# Patient Record
Sex: FEMALE | Race: BLACK OR AFRICAN AMERICAN | Employment: FULL TIME | URBAN - METROPOLITAN AREA
[De-identification: names, ages, dates, MRNs, and addresses within clinical notes are randomized per-mention and may not be internally consistent; named-entity substitution may affect disease eponyms.]

---

## 2018-12-01 ENCOUNTER — APPOINTMENT (OUTPATIENT)
Dept: CT IMAGING | Age: 38
End: 2018-12-01
Payer: COMMERCIAL

## 2018-12-01 ENCOUNTER — HOSPITAL ENCOUNTER (EMERGENCY)
Age: 38
Discharge: HOME | End: 2018-12-01
Attending: EMERGENCY MEDICINE
Payer: COMMERCIAL

## 2018-12-01 VITALS
OXYGEN SATURATION: 99 % | TEMPERATURE: 98.8 F | HEIGHT: 62 IN | WEIGHT: 293 LBS | RESPIRATION RATE: 17 BRPM | HEART RATE: 73 BPM | DIASTOLIC BLOOD PRESSURE: 97 MMHG | BODY MASS INDEX: 53.92 KG/M2 | SYSTOLIC BLOOD PRESSURE: 153 MMHG

## 2018-12-01 DIAGNOSIS — E34.9 ELEVATED SERUM HCG: ICD-10-CM

## 2018-12-01 DIAGNOSIS — G43.B0 OPHTHALMOPLEGIC MIGRAINE, NOT INTRACTABLE: Primary | ICD-10-CM

## 2018-12-01 DIAGNOSIS — R51.9 ACUTE NONINTRACTABLE HEADACHE, UNSPECIFIED HEADACHE TYPE: ICD-10-CM

## 2018-12-01 LAB
A/G RATIO: 1.2 (ref 1.1–2.2)
ALBUMIN SERPL-MCNC: 3.8 G/DL (ref 3.4–5)
ALP BLD-CCNC: 85 U/L (ref 40–129)
ALT SERPL-CCNC: 11 U/L (ref 10–40)
ANION GAP SERPL CALCULATED.3IONS-SCNC: 11 MMOL/L (ref 3–16)
APTT: 26.6 SEC (ref 26–36)
AST SERPL-CCNC: 14 U/L (ref 15–37)
BASOPHILS ABSOLUTE: 0.1 K/UL (ref 0–0.2)
BASOPHILS RELATIVE PERCENT: 0.7 %
BILIRUB SERPL-MCNC: <0.2 MG/DL (ref 0–1)
BILIRUBIN URINE: NEGATIVE
BLOOD, URINE: NEGATIVE
BUN BLDV-MCNC: 9 MG/DL (ref 7–20)
CALCIUM SERPL-MCNC: 9.2 MG/DL (ref 8.3–10.6)
CHLORIDE BLD-SCNC: 102 MMOL/L (ref 99–110)
CLARITY: CLEAR
CO2: 24 MMOL/L (ref 21–32)
COLOR: ABNORMAL
CREAT SERPL-MCNC: <0.5 MG/DL (ref 0.6–1.1)
EOSINOPHILS ABSOLUTE: 0.1 K/UL (ref 0–0.6)
EOSINOPHILS RELATIVE PERCENT: 0.7 %
EPITHELIAL CELLS, UA: NORMAL /HPF
GFR AFRICAN AMERICAN: >60
GFR NON-AFRICAN AMERICAN: >60
GLOBULIN: 3.2 G/DL
GLUCOSE BLD-MCNC: 114 MG/DL (ref 70–99)
GLUCOSE URINE: NEGATIVE MG/DL
GONADOTROPIN, CHORIONIC (HCG) QUANT: 10.4 MIU/ML
HCT VFR BLD CALC: 35.8 % (ref 36–48)
HEMOGLOBIN: 11.8 G/DL (ref 12–16)
INR BLD: 0.94 (ref 0.86–1.14)
KETONES, URINE: NEGATIVE MG/DL
LEUKOCYTE ESTERASE, URINE: ABNORMAL
LYMPHOCYTES ABSOLUTE: 1.7 K/UL (ref 1–5.1)
LYMPHOCYTES RELATIVE PERCENT: 16.7 %
MCH RBC QN AUTO: 26.9 PG (ref 26–34)
MCHC RBC AUTO-ENTMCNC: 33 G/DL (ref 31–36)
MCV RBC AUTO: 81.6 FL (ref 80–100)
MICROSCOPIC EXAMINATION: YES
MONOCYTES ABSOLUTE: 0.5 K/UL (ref 0–1.3)
MONOCYTES RELATIVE PERCENT: 5.1 %
NEUTROPHILS ABSOLUTE: 7.9 K/UL (ref 1.7–7.7)
NEUTROPHILS RELATIVE PERCENT: 76.8 %
NITRITE, URINE: NEGATIVE
PDW BLD-RTO: 15.1 % (ref 12.4–15.4)
PH UA: 6.5
PLATELET # BLD: 262 K/UL (ref 135–450)
PMV BLD AUTO: 7.9 FL (ref 5–10.5)
POTASSIUM SERPL-SCNC: 3.7 MMOL/L (ref 3.5–5.1)
PROTEIN UA: NEGATIVE MG/DL
PROTHROMBIN TIME: 10.7 SEC (ref 9.8–13)
RBC # BLD: 4.39 M/UL (ref 4–5.2)
RBC UA: NORMAL /HPF (ref 0–2)
SODIUM BLD-SCNC: 137 MMOL/L (ref 136–145)
SPECIFIC GRAVITY UA: 1.01
TOTAL PROTEIN: 7 G/DL (ref 6.4–8.2)
URINE TYPE: ABNORMAL
UROBILINOGEN, URINE: 0.2 E.U./DL
WBC # BLD: 10.3 K/UL (ref 4–11)
WBC UA: NORMAL /HPF (ref 0–5)

## 2018-12-01 PROCEDURE — 81001 URINALYSIS AUTO W/SCOPE: CPT

## 2018-12-01 PROCEDURE — 70498 CT ANGIOGRAPHY NECK: CPT

## 2018-12-01 PROCEDURE — 2580000003 HC RX 258: Performed by: EMERGENCY MEDICINE

## 2018-12-01 PROCEDURE — 85025 COMPLETE CBC W/AUTO DIFF WBC: CPT

## 2018-12-01 PROCEDURE — 99284 EMERGENCY DEPT VISIT MOD MDM: CPT

## 2018-12-01 PROCEDURE — 80053 COMPREHEN METABOLIC PANEL: CPT

## 2018-12-01 PROCEDURE — 70496 CT ANGIOGRAPHY HEAD: CPT

## 2018-12-01 PROCEDURE — 84702 CHORIONIC GONADOTROPIN TEST: CPT

## 2018-12-01 PROCEDURE — 96374 THER/PROPH/DIAG INJ IV PUSH: CPT

## 2018-12-01 PROCEDURE — 85610 PROTHROMBIN TIME: CPT

## 2018-12-01 PROCEDURE — 36415 COLL VENOUS BLD VENIPUNCTURE: CPT

## 2018-12-01 PROCEDURE — 93005 ELECTROCARDIOGRAM TRACING: CPT | Performed by: EMERGENCY MEDICINE

## 2018-12-01 PROCEDURE — 85730 THROMBOPLASTIN TIME PARTIAL: CPT

## 2018-12-01 PROCEDURE — 96361 HYDRATE IV INFUSION ADD-ON: CPT

## 2018-12-01 PROCEDURE — 70450 CT HEAD/BRAIN W/O DYE: CPT

## 2018-12-01 PROCEDURE — 6360000002 HC RX W HCPCS: Performed by: EMERGENCY MEDICINE

## 2018-12-01 PROCEDURE — 6360000004 HC RX CONTRAST MEDICATION: Performed by: EMERGENCY MEDICINE

## 2018-12-01 RX ORDER — 0.9 % SODIUM CHLORIDE 0.9 %
1000 INTRAVENOUS SOLUTION INTRAVENOUS ONCE
Status: COMPLETED | OUTPATIENT
Start: 2018-12-01 | End: 2018-12-01

## 2018-12-01 RX ADMIN — SODIUM CHLORIDE 1000 ML: 9 INJECTION, SOLUTION INTRAVENOUS at 15:27

## 2018-12-01 RX ADMIN — IOVERSOL 100 ML: 678 INJECTION INTRA-ARTERIAL; INTRAVENOUS at 15:05

## 2018-12-01 RX ADMIN — PROCHLORPERAZINE EDISYLATE 10 MG: 5 INJECTION INTRAMUSCULAR; INTRAVENOUS at 15:26

## 2018-12-01 ASSESSMENT — PAIN DESCRIPTION - ORIENTATION
ORIENTATION: LEFT;RIGHT
ORIENTATION: RIGHT;LEFT
ORIENTATION: LEFT;RIGHT

## 2018-12-01 ASSESSMENT — PAIN DESCRIPTION - LOCATION
LOCATION: HEAD

## 2018-12-01 ASSESSMENT — PAIN SCALES - GENERAL
PAINLEVEL_OUTOF10: 3
PAINLEVEL_OUTOF10: 6
PAINLEVEL_OUTOF10: 2

## 2018-12-01 ASSESSMENT — PAIN DESCRIPTION - DESCRIPTORS
DESCRIPTORS: PRESSURE;THROBBING
DESCRIPTORS: THROBBING

## 2018-12-02 PROCEDURE — 93010 ELECTROCARDIOGRAM REPORT: CPT | Performed by: INTERNAL MEDICINE

## 2018-12-04 LAB
GLUCOSE BLD-MCNC: 114 MG/DL (ref 70–99)
PERFORMED ON: ABNORMAL

## 2018-12-26 LAB
EKG ATRIAL RATE: 77 BPM
EKG DIAGNOSIS: NORMAL
EKG P AXIS: 47 DEGREES
EKG P-R INTERVAL: 160 MS
EKG Q-T INTERVAL: 398 MS
EKG QRS DURATION: 78 MS
EKG QTC CALCULATION (BAZETT): 450 MS
EKG R AXIS: 25 DEGREES
EKG T AXIS: 42 DEGREES
EKG VENTRICULAR RATE: 77 BPM

## 2019-09-12 ENCOUNTER — OFFICE VISIT (OUTPATIENT)
Dept: ORTHOPEDIC SURGERY | Age: 39
End: 2019-09-12
Payer: COMMERCIAL

## 2019-09-12 VITALS — HEART RATE: 61 BPM | DIASTOLIC BLOOD PRESSURE: 85 MMHG | TEMPERATURE: 97.5 F | SYSTOLIC BLOOD PRESSURE: 132 MMHG

## 2019-09-12 DIAGNOSIS — M75.82 ROTATOR CUFF TENDONITIS, LEFT: ICD-10-CM

## 2019-09-12 DIAGNOSIS — M25.512 ACUTE PAIN OF LEFT SHOULDER: Primary | ICD-10-CM

## 2019-09-12 PROBLEM — H69.83 ETD (EUSTACHIAN TUBE DYSFUNCTION), BILATERAL: Status: ACTIVE | Noted: 2018-04-01

## 2019-09-12 PROBLEM — K21.9 LARYNGOPHARYNGEAL REFLUX (LPR): Status: ACTIVE | Noted: 2018-04-01

## 2019-09-12 PROBLEM — H69.93 ETD (EUSTACHIAN TUBE DYSFUNCTION), BILATERAL: Status: ACTIVE | Noted: 2018-04-01

## 2019-09-12 PROCEDURE — 20610 DRAIN/INJ JOINT/BURSA W/O US: CPT | Performed by: PHYSICIAN ASSISTANT

## 2019-09-12 PROCEDURE — 99202 OFFICE O/P NEW SF 15 MIN: CPT | Performed by: PHYSICIAN ASSISTANT

## 2019-09-12 NOTE — PROGRESS NOTES
Years: 6.00     Pack years: 1.50    Smokeless tobacco: Never Used    Tobacco comment: quit smoking 2  years ago, encouraged to never smoke again   Substance and Sexual Activity    Alcohol use: Yes     Alcohol/week: 0.0 standard drinks     Comment: socially    Drug use: No    Sexual activity: Yes     Partners: Male       Current Outpatient Medications   Medication Sig Dispense Refill    omeprazole (PRILOSEC) 20 MG capsule Take 1 capsule by mouth daily 30 capsule 3     No current facility-administered medications for this visit. Objective:   She  is alert, oriented x 3, pleasant, well nourished, developed and in no acute distress. /85   Pulse 61   Temp 97.5 °F (36.4 °C) (Temporal)      SHOULDER EXAM:  Examination of the left shoulder shows: There is not a deformity. There is not erythema. There is not soft tissue swelling. Deltoid region is  tender to palpation. AC Joint is not tender to palpation. Clavicle is not tender to palpation. Bicipital Groove is not tender to palpation. Pectoralis  is not tender to palpation. Scapula/ trapezius is not tender to palpation. There is not weakness with supraspinatus testing. There is  pain with supraspinatus testing. Yergason Test negative. Drop Arm Test negative. Apprehension Test negative. Cross Arm Test negative. Shoulder AROM-   degrees  Abduction 120 degrees  IR to ASIS                 ER 60     NEUROLOGICAL EXAM:  Examination of the upper extremities are intact with sensation to light touch. Motor testing  5/5 in all major motor groups including hand intrinsics. Radial, Median and Ulnar nerves are intact. Sorenson's Sign absent. VASCULAR EXAM:  Examination of the upper extremities shows intact perfusion to all extremities. No cyanosis. Digits are warm to touch. Capillary refill is less than 2 seconds. no edema noted.      SKIN:  Examination of the skin over the upper extremities:  Reveals the skin to be intact without lacerations or abrasions. There are no significant erythema, rashes or skin lesions. X Rays: performed in the office today:   AP, Y and Axillary views of the left shoulder. There is no evidence of fracture or dislocation. The subacromial space is not  narrowed. There is not evidence of AC joint arthritis. There is not evidence of Gleno-Humeral arthritis. Assessment:       ICD-10-CM    1. Acute pain of left shoulder M25.512 XR SHOULDER LEFT (MIN 2 VIEWS)   2. Rotator cuff tendonitis, left M75.82 Ambulatory referral to Physical Therapy     MT ARTHROCENTESIS ASPIR&/INJ MAJOR JT/BURSA W/O US     MT TRIAMCINOLONE ACETONIDE INJ        Plan:     Acute left shoulder pain x10 days most likely related to rotator cuff tendinitis. I am concerned with her decreased range of motion and pain complaints she may be developing early adhesive capsulitis. The natural history of the patient's diagnosis as well as the treatment options were discussed in full and questions were answered. Risks and benefits of the treatment options also reviewed in detail. I have discussed risks and benefits of steroid injections to help alleviate pain and to reduce inflammation. She understands and agrees to injection. Cortisone  Injection  PROCEDURE NOTE:  Pre op Diagnosis: Rotator cuff tendinitis  Post op Diagnosis: Same  With the patient's permission, her left shoulder was prepped  in standard sterile fashion with  Alcohol and 2 cc of 0.25% Marcaine and 1 cc of Kenalog 40 mg was injected into the left lateral subacromial space  without difficulty. The patient tolerated this well without difficulty. A band-aid was applied. The patient was advised to ice the shoulder for 15-20 minutes to relieve any injection site related pain. PT Rx provided 2-3 times a week for the next 4 to 6 weeks. Recommend that she start therapy next week. Follow Up: 6 weeks.   Call or return to clinic prn if these symptoms worsen or

## 2019-09-16 NOTE — PLAN OF CARE
Outpatient Physical Therapy  [x] [facility]    Phone: [phone]   Fax: [phone]   [] [facility] [place] [facility]  Phone: [phone]              Fax: [phone]  [] [facility]   Phone: [phone]   Fax: [phone]     To:        Patient: Camila Buchanan   : 1980   MRN: [id number]  Evaluation Date: 2019      Diagnosis Information:  ·  Dx - Rotator cuff tendonitis  · Decreased functional mobility 2/2 shoulder pain   ·       Physical Therapy Certification    Dear LALI Navarro  The following patient has been evaluated for physical therapy services and for therapy to continue, Medicare requires monthly physician review of the treatment plan. Please review the attached evaluation and/or summary of the patient's plan of care, and verify that you agree therapy should continue by signing the attached document and sending it back to our office. Plan of Care/Treatment to date:  [x] Therapeutic Exercise    [x] Modalities:  [x] Therapeutic Activity     [] Ultrasound  [] Electrical Stimulation  [] Gait Training      [] Cervical Traction [] Lumbar Traction  [] Neuromuscular Re-education    [] Cold/hotpack [] Iontophoresis   [x] Instruction in HEP     Other:  [x] Manual Therapy      []             [] Aquatic Therapy      []           ? Frequency/Duration:  # Days per week: [] 1 day # Weeks: [] 1 week [] 5 weeks     [x] 2 days? [] 2 weeks [] 6 weeks     [] 3 days   [] 3 weeks [] 7 weeks     [] 4 days   [] 4 weeks [x] 8 weeks    Rehab Potential: [] Excellent [x] Good [] Fair  [] Poor       Electronically signed by:  Lucas Parker, PT  [id number]      If you have any questions or concerns, please don't hesitate to call.   Thank you for your referral.      Physician Signature:________________________________Date:__________________  By signing above, therapists plan is approved by physician

## 2019-09-17 ENCOUNTER — HOSPITAL ENCOUNTER (OUTPATIENT)
Dept: PHYSICAL THERAPY | Age: 39
Setting detail: THERAPIES SERIES
Discharge: HOME | End: 2019-09-17
Payer: COMMERCIAL

## 2019-09-17 PROCEDURE — 97110 THERAPEUTIC EXERCISES: CPT

## 2019-09-17 PROCEDURE — 97161 PT EVAL LOW COMPLEX 20 MIN: CPT

## 2019-09-17 PROCEDURE — 97530 THERAPEUTIC ACTIVITIES: CPT

## 2019-09-17 NOTE — FLOWSHEET NOTE
Physical Therapy Daily Treatment Note  Date:  2019    Patient Name:  Kerry Fong    :  1980  MRN: [id number]    Restrictions/Precautions: Position Activity Restriction  Other position/activity restrictions: no significant fall risk noted      Pertinent Medical History: Additional Pertinent Hx: alopecia, back pain, cold intelerance, reflux, GERD, hiatal hernia, allergies. Medical/Treatment Diagnosis Information:  ·  Diagnosis - left RC tendonitis  · Treatment Diagnosis: Decreased functional mobility 2/2 left shoulder pain    Insurance/Certification information:  PT Insurance Information: [employer]  Physician Information:  Referring Practitioner: Coby ESCALERA  Plan of care signed (Y/N):  Sent     Visit# / total visits:    Pain level: 3-4/10      Functional Assessment:   Test:  Score:  58.75 UEFs    History of Injury  Subjective: Pt with several month history of shoulder feeling weak and heavy, pain began the first week of September without precipitating injury,     Subjective:  Cortixone Shot  decreased pain from 9-10 to minimal at rest 7/10 with any sudden movements. Objective:    RUE General AROM: Painful with any elevation. Supine PROM flexion 120, painful arc,  ER 65, IR 70 at 60 abd.   abd to 90  Comment: isometric assessment seated, flexion 4-/5, ER 4+/5, IR 4+/5,  abd 4-/5     Exercise/Equipment Resistance/Repetitions Other comments        NT UE only 0#  ADD    UE ranger floor ADD    pulleys add         Shoulder retr add    Low door stretch add         Supine  PROM gentle   Gentle x 10 each     supine isometrics add         Cane exercises  Seated ER/IR  Standing Ext  Standing IR   10  10  10    Counter dusting X 5 each         iontophoresis ADD Script sent ADD NEXT VISIT        CP 10 min left shoulder seated                Other Therapeutic Activities:  Pt was educated on PT POC, Diagnosis, Prognosis, pathomechanics as well as frequency and duration of scheduling future

## 2019-09-20 ENCOUNTER — HOSPITAL ENCOUNTER (OUTPATIENT)
Dept: PHYSICAL THERAPY | Age: 39
Setting detail: THERAPIES SERIES
End: 2019-09-20
Payer: COMMERCIAL

## 2019-09-23 ENCOUNTER — HOSPITAL ENCOUNTER (OUTPATIENT)
Dept: PHYSICAL THERAPY | Age: 39
Setting detail: THERAPIES SERIES
Discharge: HOME | End: 2019-09-23
Payer: COMMERCIAL

## 2019-09-23 PROCEDURE — 97110 THERAPEUTIC EXERCISES: CPT

## 2019-09-23 PROCEDURE — 97033 APP MDLTY 1+IONTPHRSIS EA 15: CPT

## 2019-09-23 NOTE — FLOWSHEET NOTE
Activities:  Pt was educated on PT POC, Diagnosis, Prognosis, pathomechanics as well as frequency and duration of scheduling future physical therapy appointments. Time was also taken on this day to answer all patient questions and participation in PT. Reviewed appointment policy in detail with patient and patient verbalized understanding. Home Exercise Program:  Patient instructed in the following for HEP:   Patient verbalized/demonstrated understanding and was issued written HEP.     Timed Code Treatment Minutes:  37    Total Treatment Minutes:  37    Treatment/Activity Tolerance:  [] Patient tolerated treatment well [] Patient limited by fatigue  [] Patient limited by pain  [] Patient limited by other medical complications  [x] Other: progressing with decreased pain    Prognosis: [x] Good [] Fair  [] Poor    Goals:    Long term goals  Time Frame for Long term goals : discharge  Long term goal 1: full functional ROM for self care, bathing and dressing  Long term goal 2: Full functional strength for ADL and homemaking  Long term goal 3: Return to normal work activities without limitation     Patient Requires Follow-up: [x] Yes  [] No    Plan:   [] Continue per plan of care [] Alter current plan (see comments)  [x] Plan of care initiated [] Hold pending MD visit [] Discharge    Plan for Next Session:  Add above as stated    Electronically signed by:  Dameon Albarran

## 2019-09-25 ENCOUNTER — HOSPITAL ENCOUNTER (OUTPATIENT)
Dept: PHYSICAL THERAPY | Age: 39
Setting detail: THERAPIES SERIES
Discharge: HOME | End: 2019-09-25
Payer: COMMERCIAL

## 2019-09-25 PROCEDURE — 97033 APP MDLTY 1+IONTPHRSIS EA 15: CPT

## 2019-09-25 PROCEDURE — 97110 THERAPEUTIC EXERCISES: CPT

## 2019-10-01 ENCOUNTER — HOSPITAL ENCOUNTER (OUTPATIENT)
Dept: PHYSICAL THERAPY | Age: 39
Setting detail: THERAPIES SERIES
Discharge: HOME | End: 2019-10-01
Payer: COMMERCIAL

## 2019-10-01 PROCEDURE — 97033 APP MDLTY 1+IONTPHRSIS EA 15: CPT

## 2019-10-01 PROCEDURE — 97110 THERAPEUTIC EXERCISES: CPT

## 2019-10-04 ENCOUNTER — APPOINTMENT (OUTPATIENT)
Dept: PHYSICAL THERAPY | Age: 39
End: 2019-10-04
Payer: COMMERCIAL

## 2019-10-07 ENCOUNTER — APPOINTMENT (OUTPATIENT)
Dept: PHYSICAL THERAPY | Age: 39
End: 2019-10-07
Payer: COMMERCIAL

## 2019-10-09 ENCOUNTER — APPOINTMENT (OUTPATIENT)
Dept: PHYSICAL THERAPY | Age: 39
End: 2019-10-09
Payer: COMMERCIAL

## 2019-10-15 ENCOUNTER — HOSPITAL ENCOUNTER (OUTPATIENT)
Dept: PHYSICAL THERAPY | Age: 39
Setting detail: THERAPIES SERIES
Discharge: HOME | End: 2019-10-15
Payer: COMMERCIAL

## 2019-10-15 PROCEDURE — 97530 THERAPEUTIC ACTIVITIES: CPT

## 2019-10-18 ENCOUNTER — APPOINTMENT (OUTPATIENT)
Dept: PHYSICAL THERAPY | Age: 39
End: 2019-10-18
Payer: COMMERCIAL

## 2019-10-21 ENCOUNTER — APPOINTMENT (OUTPATIENT)
Dept: PHYSICAL THERAPY | Age: 39
End: 2019-10-21
Payer: COMMERCIAL

## 2019-10-23 ENCOUNTER — APPOINTMENT (OUTPATIENT)
Dept: PHYSICAL THERAPY | Age: 39
End: 2019-10-23
Payer: COMMERCIAL

## 2021-09-12 ENCOUNTER — HOSPITAL ENCOUNTER (EMERGENCY)
Age: 41
Discharge: HOME | End: 2021-09-12
Attending: EMERGENCY MEDICINE
Payer: COMMERCIAL

## 2021-09-12 VITALS
SYSTOLIC BLOOD PRESSURE: 121 MMHG | TEMPERATURE: 98.5 F | WEIGHT: 293 LBS | RESPIRATION RATE: 14 BRPM | BODY MASS INDEX: 53.92 KG/M2 | DIASTOLIC BLOOD PRESSURE: 74 MMHG | HEIGHT: 62 IN | OXYGEN SATURATION: 100 % | HEART RATE: 78 BPM

## 2021-09-12 DIAGNOSIS — R07.89 ATYPICAL CHEST PAIN: Primary | ICD-10-CM

## 2021-09-12 LAB
ANION GAP SERPL CALCULATED.3IONS-SCNC: 9 MMOL/L (ref 3–16)
BASOPHILS ABSOLUTE: 0.1 K/UL (ref 0–0.2)
BASOPHILS RELATIVE PERCENT: 0.7 %
BUN BLDV-MCNC: 9 MG/DL (ref 7–20)
CALCIUM SERPL-MCNC: 9.1 MG/DL (ref 8.3–10.6)
CHLORIDE BLD-SCNC: 104 MMOL/L (ref 99–110)
CO2: 27 MMOL/L (ref 21–32)
CREAT SERPL-MCNC: 0.6 MG/DL (ref 0.6–1.1)
EOSINOPHILS ABSOLUTE: 0.1 K/UL (ref 0–0.6)
EOSINOPHILS RELATIVE PERCENT: 1.4 %
GFR AFRICAN AMERICAN: >60
GFR NON-AFRICAN AMERICAN: >60
GLUCOSE BLD-MCNC: 83 MG/DL (ref 70–99)
HCT VFR BLD CALC: 35.8 % (ref 36–48)
HEMOGLOBIN: 11.9 G/DL (ref 12–16)
LYMPHOCYTES ABSOLUTE: 1.8 K/UL (ref 1–5.1)
LYMPHOCYTES RELATIVE PERCENT: 20.7 %
MCH RBC QN AUTO: 27.8 PG (ref 26–34)
MCHC RBC AUTO-ENTMCNC: 33.3 G/DL (ref 31–36)
MCV RBC AUTO: 83.4 FL (ref 80–100)
MONOCYTES ABSOLUTE: 0.6 K/UL (ref 0–1.3)
MONOCYTES RELATIVE PERCENT: 7.2 %
NEUTROPHILS ABSOLUTE: 6.3 K/UL (ref 1.7–7.7)
NEUTROPHILS RELATIVE PERCENT: 70 %
PDW BLD-RTO: 14.1 % (ref 12.4–15.4)
PLATELET # BLD: 270 K/UL (ref 135–450)
PMV BLD AUTO: 7.5 FL (ref 5–10.5)
POTASSIUM REFLEX MAGNESIUM: 3.8 MMOL/L (ref 3.5–5.1)
RBC # BLD: 4.29 M/UL (ref 4–5.2)
SODIUM BLD-SCNC: 140 MMOL/L (ref 136–145)
TROPONIN: <0.01 NG/ML
WBC # BLD: 8.9 K/UL (ref 4–11)

## 2021-09-12 PROCEDURE — 99283 EMERGENCY DEPT VISIT LOW MDM: CPT

## 2021-09-12 PROCEDURE — 84484 ASSAY OF TROPONIN QUANT: CPT

## 2021-09-12 PROCEDURE — 80048 BASIC METABOLIC PNL TOTAL CA: CPT

## 2021-09-12 PROCEDURE — 36415 COLL VENOUS BLD VENIPUNCTURE: CPT

## 2021-09-12 PROCEDURE — 93005 ELECTROCARDIOGRAM TRACING: CPT | Performed by: EMERGENCY MEDICINE

## 2021-09-12 PROCEDURE — 85025 COMPLETE CBC W/AUTO DIFF WBC: CPT

## 2021-09-12 RX ORDER — METRONIDAZOLE 7.5 MG/G
GEL TOPICAL
COMMUNITY
End: 2022-10-16 | Stop reason: ALTCHOICE

## 2021-09-12 ASSESSMENT — PAIN DESCRIPTION - DESCRIPTORS: DESCRIPTORS: DULL;ACHING

## 2021-09-12 ASSESSMENT — PAIN DESCRIPTION - LOCATION: LOCATION: CHEST

## 2021-09-12 ASSESSMENT — PAIN DESCRIPTION - ORIENTATION: ORIENTATION: LEFT

## 2021-09-12 ASSESSMENT — PAIN DESCRIPTION - PAIN TYPE: TYPE: ACUTE PAIN

## 2021-09-12 ASSESSMENT — PAIN SCALES - GENERAL: PAINLEVEL_OUTOF10: 4

## 2021-09-12 NOTE — ED PROVIDER NOTES
eMERGENCY dEPARTMENT eNCOUnter      Pt Name: Kellen Reynolds  MRN: [id number]  [place] 1980  Date of evaluation: 9/12/2021  Provider: Odalis Barnes MD     [address] [place] [address]       Chief Complaint   Patient presents with    Chest Pain     has had for several days, started with neck pain then intermittent under left breast. Today shooting through back and down left arm. Yesterday was lightheaded. Nausea today         HISTORY OF PRESENT ILLNESS   (Location/Symptom, Timing/Onset,Context/Setting, Quality, Duration, Modifying Factors, Severity) Note limiting factors. HPI    Kellen Reynolds is a 46 y.o. female who presents to the emergency department with some chest pain that radiates to her neck and arm for several days. Patient denies any injury. Starts under her left breast that shoots up to the left shoulder trapezius area and down the forearm. The chest pain is intermittent lasting only few seconds. There is some pressure sensation of the arm. Movement is not associated with the pain or does not exacerbates. Patient denies any injury. States not mostly on the left side. There has been no shortness of breath nausea or vomiting. Patient has no other risk factor except for obesity. Patient is not a diabetic. Nursing Notes were reviewed. REVIEW OFSYSTEMS    (2+ for level 4; 10+ for level 5)   Review of Systems    General: No fevers, chills or night sweats, No weight loss    Head:  No Sore throat,  No Ear Pain    Chest: tender. No Cough, No SOB,  Chest Pain is positive and its intermittent. GI: No abdominal pain or vomiting    : No dysuria or hematuria    Musculoskeletal: No unrelenting pain or night pain    Neurologic: No bowel or bladder incontinence, No saddle anesthesia, No leg weakness    All other systems reviewed and are negative.         PAST MEDICAL HISTORY     Past Medical History:   Diagnosis Date    Alopecia     hair loss    Anesthesia     pt states she wakes up during surgery  Back pain     injury 15 years ago--pt states she is not medically treated    Cold intolerance     Esophageal reflux 2009    GERD (gastroesophageal reflux disease)     Gestational diabetes     Hiatal hernia     Seasonal allergies     spring and summer    Weight gain     OB thought due hormonal imbalance       SURGICAL HISTORY       Past Surgical History:   Procedure Laterality Date    ABDOMEN SURGERY      COLON SURGERY  16    LIPOMA RESECTION Right 2006    removed from right arm    TUBAL LIGATION  2004       CURRENT MEDICATIONS       Previous Medications    METRONIDAZOLE (METROGEL) 0.75 % GEL    metronidazole 0.75 % topical gel       ALLERGIES     Patient has no known allergies. FAMILY HISTORY       Family History   Problem Relation Age of Onset    Alcohol Abuse Father     Thyroid Disease Mother     Diabetes Mother     Other Sister         Cognitive Delay    Other Sister         GERD    Other Sister         pre cancerous cervical cells    Other Brother         Cognitive delay        SOCIAL HISTORY       Social History     Socioeconomic History    Marital status:      Spouse name: None    Number of children: 2    Years of education: None    Highest education level: None   Occupational History    Occupation:      Employer: [employer]   Tobacco Use    Smoking status: Former Smoker     Packs/day: 0.25     Years: 6.00     Pack years: 1.50     Quit date: 2014     Years since quittin.0    Smokeless tobacco: Never Used    Tobacco comment: quit smoking 2  years ago, encouraged to never smoke again   Substance and Sexual Activity    Alcohol use:  Yes     Alcohol/week: 0.0 standard drinks     Comment: socially    Drug use: No    Sexual activity: Yes     Partners: Male   Other Topics Concern    None   Social History Narrative    None     Social Determinants of Health     Financial Resource Strain:     Difficulty of Paying Living Expenses:    Food Insecurity:     Worried About Running Out of Food in the Last Year:     [address] in the Last Year:    Transportation Needs:     Lack of Transportation (Medical):  Lack of Transportation (Non-Medical):    Physical Activity:     Days of Exercise per Week:     Minutes of Exercise per Session:    Stress:     Feeling of Stress :    Social Connections:     Frequency of Communication with Friends and Family:     Frequency of Social Gatherings with Friends and Family:     Attends Voodoo Services:     Active Member of Clubs or Organizations:     Attends Club or Organization Meetings:     Marital Status:    Intimate Partner Violence:     Fear of Current or Ex-Partner:     Emotionally Abused:     Physically Abused:     Sexually Abused:        SCREENINGS           PHYSICAL EXAM    (up to 7 for level 4, 8 or more for level 5)     ED Triage Vitals   BP Temp Temp src Pulse Resp SpO2 Height Weight   -- -- -- -- -- -- -- --       Physical Exam    General: Alert and awake ×3. Nontoxic appearance. Well-developed well-nourished 71-year-old in no acute distress obese female. HEENT: Normocephalic atraumatic. Neck is supple. Airway intact. No adenopathy  Cardiac: Regular rate and rhythm with no murmurs rubs or gallops. Chest wall is tender on the left side. Neurovascular exam is normal.  Pulmonary: Lungs are clear in all lung fields. No wheezing. No Rales. Abdomen: Soft and nontender. Negative hepatosplenomegaly. Bowel sounds are active  Extremities: Moving all extremities. No calf tenderness. Peripheral pulses all intact  Skin: No skin lesions. No rashes  Neurologic: Cranial nerves II through XII was grossly intact. Nonfocal neurological exam  Psychiatric: Patient is pleasant. Mood is appropriate.         DIAGNOSTIC RESULTS     EKG (Per Emergency Physician):   Normal sinus rhythm ventricular of 71 minimal voltage for LVH borderline EKG    RADIOLOGY (Per Emergency Physician): Interpretation per the Radiologist below, if available at the time of this note:  No results found. ED BEDSIDE ULTRASOUND:   Performed by ED Physician - none    LABS:  Labs Reviewed   CBC WITH AUTO DIFFERENTIAL - Abnormal; Notable for the following components:       Result Value    Hemoglobin 11.9 (*)     Hematocrit 35.8 (*)     All other components within normal limits    Narrative:     Performed at:  [facility]  [address] [place], [place]   Phone [phone]   [address] MG FOR LOW K    Narrative:     Performed at:  [facility]  [address] [place], [place]   Phone [phone]   TROPONIN    Narrative:     Performed at:  [facility]  [address] [place], [place]   Phone [phone]        All other labs were within normal range or not returned as of this dictation. Procedures      EMERGENCY DEPARTMENT COURSE and DIFFERENTIAL DIAGNOSIS/MDM:   Vitals:    Vitals:    09/12/21 1542 09/12/21 1553   BP:  128/74   Pulse:  83   Temp:  98.5 °F (36.9 °C)   TempSrc:  Oral   Weight: (!) 315 lb (142.9 kg) (!) 317 lb 1.6 oz (143.8 kg)   Height: 5' 2\" (1.575 m) 5' 2\" (1.575 m)       Medications - No data to display    MDM. Is obese 72-year-old with some chest pain that is intermittent with some component of muscle strain. It is reproducible. Neurovascular is intact exam is reassuring EKG is normal labs are unremarkable patient will need outpatient follow-up could be a pinched nerve other differential includes atypical chest pain. Doubt it is related to pulmonary etiology at this time. Patient will discharge to follow-up return if worse. REVAL:         CRITICAL CARE TIME   Total CriticalCare time was 0 minutes, excluding separately reportable procedures.   There was a high probability of clinically significant/life threatening deterioration in the patient's condition which required my urgent intervention. CONSULTS:  None    PROCEDURES:  Unless otherwise noted below, none     [unfilled]    FINAL IMPRESSION      1. Atypical chest pain          DISPOSITION/PLAN   DISPOSITION        PATIENT REFERRED TO:  [place] [id number] [phone]    Schedule an appointment as soon as possible for a visit in 1 week  If symptoms worsen, As needed      DISCHARGE MEDICATIONS:  New Prescriptions    No medications on file          (Please note:  Portions of this note were completed with a voice recognition program.Efforts were made to edit the dictations but occasionally words and phrases are mis-transcribed.)  Form v2016. J.5-cn    JHAAIRA MCGARRY MD (electronically signed)  Emergency Medicine Provider        Emily Maki MD  09/12/21 0393

## 2021-09-13 LAB
EKG ATRIAL RATE: 71 BPM
EKG DIAGNOSIS: NORMAL
EKG P AXIS: 23 DEGREES
EKG P-R INTERVAL: 154 MS
EKG Q-T INTERVAL: 422 MS
EKG QRS DURATION: 74 MS
EKG QTC CALCULATION (BAZETT): 458 MS
EKG R AXIS: 15 DEGREES
EKG T AXIS: 40 DEGREES
EKG VENTRICULAR RATE: 71 BPM

## 2021-09-13 PROCEDURE — 93010 ELECTROCARDIOGRAM REPORT: CPT | Performed by: INTERNAL MEDICINE

## 2021-12-02 ENCOUNTER — TELEPHONE (OUTPATIENT)
Dept: ORTHOPEDIC SURGERY | Age: 41
End: 2021-12-02

## 2021-12-02 ENCOUNTER — OFFICE VISIT (OUTPATIENT)
Dept: ORTHOPEDIC SURGERY | Age: 41
End: 2021-12-02
Payer: COMMERCIAL

## 2021-12-02 VITALS — BODY MASS INDEX: 53.92 KG/M2 | WEIGHT: 293 LBS | HEIGHT: 62 IN

## 2021-12-02 DIAGNOSIS — M75.81 ROTATOR CUFF TENDONITIS, RIGHT: Primary | ICD-10-CM

## 2021-12-02 DIAGNOSIS — M25.511 ACUTE PAIN OF RIGHT SHOULDER: ICD-10-CM

## 2021-12-02 DIAGNOSIS — M19.011 ARTHRITIS OF RIGHT ACROMIOCLAVICULAR JOINT: ICD-10-CM

## 2021-12-02 PROCEDURE — 99203 OFFICE O/P NEW LOW 30 MIN: CPT | Performed by: ORTHOPAEDIC SURGERY

## 2021-12-02 PROCEDURE — 20610 DRAIN/INJ JOINT/BURSA W/O US: CPT | Performed by: ORTHOPAEDIC SURGERY

## 2021-12-02 RX ORDER — BUPIVACAINE HYDROCHLORIDE 2.5 MG/ML
3 INJECTION, SOLUTION INFILTRATION; PERINEURAL ONCE
Status: COMPLETED | OUTPATIENT
Start: 2021-12-02 | End: 2021-12-02

## 2021-12-02 RX ORDER — TRIAMCINOLONE ACETONIDE 40 MG/ML
80 INJECTION, SUSPENSION INTRA-ARTICULAR; INTRAMUSCULAR ONCE
Status: COMPLETED | OUTPATIENT
Start: 2021-12-02 | End: 2021-12-02

## 2021-12-02 RX ORDER — IBUPROFEN 800 MG/1
800 TABLET ORAL 2 TIMES DAILY WITH MEALS
Qty: 60 TABLET | Refills: 0 | Status: SHIPPED | OUTPATIENT
Start: 2021-12-02

## 2021-12-02 RX ADMIN — TRIAMCINOLONE ACETONIDE 80 MG: 40 INJECTION, SUSPENSION INTRA-ARTICULAR; INTRAMUSCULAR at 11:05

## 2021-12-02 RX ADMIN — BUPIVACAINE HYDROCHLORIDE 7.5 MG: 2.5 INJECTION, SOLUTION INFILTRATION; PERINEURAL at 11:05

## 2021-12-02 NOTE — PROGRESS NOTES
Kayla Ames  <[id number]>  December 2, 2021    Chief Complaint   Patient presents with    Shoulder Pain     Rt. Pain for 2 weeks  No injury       History: The patient is a 72-year-old female who is here for evaluation of her right shoulder. The patient developed right shoulder pain approximately 2 weeks ago. She reportedly was organizing a  and she was rather active. She then developed significant right shoulder pain. She has difficulty with overhead activities. She has been using a sling for comfort. She did take some ibuprofen which seemed to help. She had a similar issue with her left shoulder in the remote past and she responded well with an injection. The patient is right-hand dominant. This is a consult from Corbin Kearns MD for right shoulder pain. The patient's  past medical history, medications, allergies,  family history, social history, and have been reviewed, and dated and are recorded in the chart. Pertinent items are noted in HPI. Review of systems reviewed from Pertinent History Form dated on 12/2/2021 and available in the patient's chart under the Media tab. Vitals:  Ht 5' 2\" (1.575 m)   Wt (!) 310 lb (140.6 kg)   BMI 56.70 kg/m²     Physical: Physical: The patient presents today in no acute distress. She is alert and oriented to person, place and time. She displays appropriate mood and affect. She is well dressed, nourished and  groomed. She has a normal affect. Examination of the neck reveals no evidence of tenderness. Spurling's sign is negative. Active range of motion of the right shoulder is: 170 degrees abduction, 170 degrees forward flexion, 45 degrees of external rotation and internal rotation to L4. Passive range of motion of the right shoulder is: 175 degrees abduction, 175 degrees forward flexion, 50 degrees of external rotation and internal rotation to L3. Active and passive range of motion of the opposite shoulder is full.  Examination of the right shoulder reveals positive Neer and Werner' impingement signs. There is mild subacromial crepitus with range of motion. Drop arm test is negative bilaterally. Speed's test is negative. There is no evidence of tenderness over the Bicipital groove. She  does have tenderness over the [facility] joint. Cross body adduction test is negative. She has moderate tenderness over the subacromial space. There is no evidence of scapular winging. Lift off sign is negative. There is no evidence of atrophy. External rotation strength is full. There are no skin lesions, cellulitis, or extreme edema in the upper extremities. Sensation is intact to axillary, median, radial, and ulnar nerves bilaterally. The patient has warm and well-perfused bilateral upper extremities with brisk capillary refill. Radial and ulnar pulses are palpable and 2+ bilaterally. X-rays: 4 views of the right shoulder obtained in the office today were extensively reviewed. There is a subtle calcification within the sub acromial space. There is a type II acromion. There is mild AC joint arthritis. Impression: #1 right shoulder calcific rotator cuff tendinitis #2 right shoulder AC joint arthritis    Plan: At this time we will inject the right shoulder under sterile conditions. After a Betadine prep of the shoulder, 3cc of 0.25% Marcaine and 2cc of 40 mg Kenalog were injected into the shoulder. The patient tolerated the injection rather well. The patient was instructed to follow up immediately for any signs of infection. The patient will follow up with me in 6 week(s). The treatment plan was discussed in detail with the patient and includes activity modification and avoidance of repetitive overhead activities. Home exercises were explained to the patient. A formal therapy program was discussed with the patient and was not indicated. If the patient continues to have severe pain and weakness, we will consider other options.   The patient was given a prescription for ibuprofen.

## 2022-10-16 ENCOUNTER — HOSPITAL ENCOUNTER (EMERGENCY)
Age: 42
Discharge: HOME | End: 2022-10-16
Attending: EMERGENCY MEDICINE
Payer: COMMERCIAL

## 2022-10-16 ENCOUNTER — APPOINTMENT (OUTPATIENT)
Dept: GENERAL RADIOLOGY | Age: 42
End: 2022-10-16
Payer: COMMERCIAL

## 2022-10-16 VITALS
HEART RATE: 95 BPM | TEMPERATURE: 98 F | OXYGEN SATURATION: 100 % | RESPIRATION RATE: 18 BRPM | HEIGHT: 62 IN | DIASTOLIC BLOOD PRESSURE: 98 MMHG | WEIGHT: 293 LBS | BODY MASS INDEX: 53.92 KG/M2 | SYSTOLIC BLOOD PRESSURE: 154 MMHG

## 2022-10-16 DIAGNOSIS — M75.31 CALCIFIC TENDINITIS OF RIGHT SHOULDER: Primary | ICD-10-CM

## 2022-10-16 DIAGNOSIS — M25.511 CHRONIC RIGHT SHOULDER PAIN: ICD-10-CM

## 2022-10-16 DIAGNOSIS — G89.29 CHRONIC RIGHT SHOULDER PAIN: ICD-10-CM

## 2022-10-16 PROCEDURE — 6370000000 HC RX 637 (ALT 250 FOR IP): Performed by: EMERGENCY MEDICINE

## 2022-10-16 PROCEDURE — 73030 X-RAY EXAM OF SHOULDER: CPT

## 2022-10-16 PROCEDURE — 99283 EMERGENCY DEPT VISIT LOW MDM: CPT

## 2022-10-16 PROCEDURE — 73502 X-RAY EXAM HIP UNI 2-3 VIEWS: CPT

## 2022-10-16 RX ORDER — NAPROXEN 500 MG/1
500 TABLET ORAL 2 TIMES DAILY WITH MEALS
Qty: 60 TABLET | Refills: 0 | Status: SHIPPED | OUTPATIENT
Start: 2022-10-16

## 2022-10-16 RX ORDER — PREDNISONE 20 MG/1
60 TABLET ORAL ONCE
Status: COMPLETED | OUTPATIENT
Start: 2022-10-16 | End: 2022-10-16

## 2022-10-16 RX ORDER — PREDNISONE 20 MG/1
20 TABLET ORAL DAILY
Qty: 5 TABLET | Refills: 0 | Status: SHIPPED | OUTPATIENT
Start: 2022-10-16 | End: 2022-10-21

## 2022-10-16 RX ORDER — LIDOCAINE 4 G/G
1 PATCH TOPICAL ONCE
Status: DISCONTINUED | OUTPATIENT
Start: 2022-10-16 | End: 2022-10-16 | Stop reason: HOSPADM

## 2022-10-16 RX ORDER — NAPROXEN 250 MG/1
250 TABLET ORAL ONCE
Status: COMPLETED | OUTPATIENT
Start: 2022-10-16 | End: 2022-10-16

## 2022-10-16 RX ADMIN — PREDNISONE 60 MG: 20 TABLET ORAL at 12:30

## 2022-10-16 RX ADMIN — NAPROXEN 250 MG: 250 TABLET ORAL at 12:31

## 2022-10-16 ASSESSMENT — PAIN DESCRIPTION - DESCRIPTORS: DESCRIPTORS: SORE

## 2022-10-16 ASSESSMENT — ENCOUNTER SYMPTOMS: BACK PAIN: 0

## 2022-10-16 ASSESSMENT — PAIN SCALES - GENERAL
PAINLEVEL_OUTOF10: 4
PAINLEVEL_OUTOF10: 6

## 2022-10-16 ASSESSMENT — PAIN DESCRIPTION - PAIN TYPE: TYPE: CHRONIC PAIN;ACUTE PAIN

## 2022-10-16 ASSESSMENT — PAIN DESCRIPTION - LOCATION: LOCATION: SHOULDER;HIP

## 2022-10-16 ASSESSMENT — PAIN DESCRIPTION - ORIENTATION: ORIENTATION: RIGHT

## 2022-10-16 NOTE — ED NOTES
Now back from xray. Lidocaine patch applied as ordered. Right shoulder pain still at 6. Right hip pain at 2.      Ximena Mendieta, RN  10/16/22 223 [facility], RN  10/16/22 1856

## 2022-10-16 NOTE — ED PROVIDER NOTES
[facility] ENCOUNTER      Pt Name: Nate Salazar  MRN: [id number]  [place] 1980  Date ofevaluation: 10/16/2022  Provider: Josefa Ballard MD    CHIEF COMPLAINT       Chief Complaint   Patient presents with    Shoulder Pain     Exacerbation of chronic R shoulder pain x2wks, Ibuprofen regimen ineffective. HISTORY OF PRESENT ILLNESS   (Location/Symptom, Timing/Onset,Context/Setting, Quality, Duration, Modifying Factors, Severity)  Note limiting factors. Nate Salazar is a 38 y.o. female  who  has a past medical history of Alopecia, Anesthesia, Back pain, Cold intolerance, Esophageal reflux, GERD (gastroesophageal reflux disease), Gestational diabetes, Hiatal hernia, Seasonal allergies, and Weight gain. who presents to the emergency department    82-year-old female presents for acute on chronic pain in her right shoulder. Patient has a history and risk factor of prior rotator cuff tendinitis which she has seen orthopedics before in the past.  Worse with lifting her arm or with palpation better with immobilization. Patient been trying ibuprofen with little relief. Patient has not followed up with her primary care doctor or orthopedics recently. Also having aching right hip pain after helping a friend move. No other associated symptoms. Symptoms are gradual in onset constant unchanging. Mild to moderate in nature. Aching and throbbing. The history is provided by the patient. No  was used. NursingNotes were reviewed. REVIEW OF SYSTEMS    (2-9 systems for level 4, 10 or more for level 5)     Review of Systems   Constitutional:  Negative for chills and fever. Musculoskeletal:  Positive for arthralgias. Negative for back pain, joint swelling and neck pain. Skin:  Negative for wound. Neurological:  Negative for weakness and numbness. All other systems reviewed and are negative.     Except as noted above the remainder of the review of systems was reviewed and negative. PAST MEDICAL HISTORY     Past Medical History:   Diagnosis Date    Alopecia     hair loss    Anesthesia     pt states she wakes up during surgery    Back pain     injury 15 years ago--pt states she is not medically treated    Cold intolerance     Esophageal reflux 2009    GERD (gastroesophageal reflux disease)     Gestational diabetes     Hiatal hernia     Seasonal allergies     spring and summer    Weight gain     OB thought due hormonal imbalance         SURGICALHISTORY       Past Surgical History:   Procedure Laterality Date    ABDOMEN SURGERY      COLON SURGERY  16    LIPOMA RESECTION Right 2006    removed from right arm    TUBAL LIGATION  2004         CURRENT MEDICATIONS       Previous Medications    IBUPROFEN (ADVIL;MOTRIN) 800 MG TABLET    Take 1 tablet by mouth 2 times daily (with meals)            Patient has no known allergies. FAMILY HISTORY       Family History   Problem Relation Age of Onset    Alcohol Abuse Father     Thyroid Disease Mother     Diabetes Mother     Other Sister         Cognitive Delay    Other Sister         GERD    Other Sister         pre cancerous cervical cells    Other Brother         Cognitive delay          SOCIAL HISTORY       Social History     Socioeconomic History    Marital status:      Spouse name: None    Number of children: 2    Years of education: None    Highest education level: None   Occupational History    Occupation:      Employer: [employer]   Tobacco Use    Smoking status: Former     Packs/day: 0.25     Years: 6.00     Pack years: 1.50     Types: Cigarettes     Quit date: 2014     Years since quittin.0    Smokeless tobacco: Never    Tobacco comments:     quit smoking 2  years ago, encouraged to never smoke again   Vaping Use    Vaping Use: Never used   Substance and Sexual Activity    Alcohol use:  Yes     Alcohol/week: 0.0 standard drinks Comment: socially    Drug use: No    Sexual activity: Yes     Partners: Male       SCREENINGS    Fran Coma Scale  Eye Opening: Spontaneous  Best Verbal Response: Oriented  Best Motor Response: Obeys commands  Fran Coma Scale Score: 15        PHYSICAL EXAM    (up to 7 for level 4, 8 or more for level 5)     ED Triage Vitals   BP Temp Temp src Pulse Resp SpO2 Height Weight   -- -- -- -- -- -- -- --       Physical Exam  Vitals and nursing note reviewed. Constitutional:       General: She is not in acute distress. Appearance: Normal appearance. She is not ill-appearing, toxic-appearing or diaphoretic. HENT:      Head: Normocephalic and atraumatic. Mouth/Throat:      Mouth: Mucous membranes are moist.   Eyes:      Extraocular Movements: Extraocular movements intact. Cardiovascular:      Rate and Rhythm: Normal rate. Pulses: Normal pulses. Pulmonary:      Effort: Pulmonary effort is normal.   Abdominal:      Palpations: Abdomen is soft. Tenderness: There is no abdominal tenderness. Musculoskeletal:      Right shoulder: Tenderness and bony tenderness present. No swelling, deformity or effusion. Decreased range of motion. Normal strength. Left shoulder: No swelling, deformity, effusion, tenderness or bony tenderness. Normal range of motion. Normal strength. Right upper arm: No swelling, deformity, tenderness or bony tenderness. Left upper arm: No swelling, deformity, tenderness or bony tenderness. Right elbow: No swelling or deformity. Normal range of motion. No tenderness. Left elbow: No swelling or deformity. Normal range of motion. No tenderness. Right forearm: No tenderness or bony tenderness. Left forearm: No tenderness or bony tenderness. Right wrist: No swelling, deformity, tenderness, bony tenderness or snuff box tenderness. Normal range of motion. Normal pulse.       Left wrist: No swelling, deformity, tenderness, bony tenderness or snuff box tenderness. Normal range of motion. Normal pulse. Right hand: No swelling, deformity, tenderness or bony tenderness. Normal range of motion. Normal strength. Normal sensation. There is no disruption of two-point discrimination. Normal capillary refill. Normal pulse. Left hand: No swelling, deformity, tenderness or bony tenderness. Normal range of motion. Normal strength. Normal sensation. There is no disruption of two-point discrimination. Normal capillary refill. Normal pulse. Right hip: Bony tenderness present. No deformity, lacerations, tenderness or crepitus. Normal range of motion. Normal strength. Left hip: Normal. No deformity, lacerations, tenderness, bony tenderness or crepitus. Normal range of motion. Normal strength. Right upper leg: Normal.      Left upper leg: Normal.      Right knee: No swelling, deformity, effusion, erythema, ecchymosis or bony tenderness. Normal range of motion. No tenderness. Normal alignment. Left knee: No swelling, deformity, effusion, erythema, ecchymosis or bony tenderness. Normal range of motion. Normal alignment. Right lower leg: No swelling, deformity, tenderness or bony tenderness. No edema. Left lower leg: No swelling, deformity, tenderness or bony tenderness. No edema. Right ankle: No swelling, deformity, ecchymosis or lacerations. No tenderness. Normal range of motion. Normal pulse. Left ankle: No swelling, deformity, ecchymosis or lacerations. No tenderness. Normal range of motion. Normal pulse. Right foot: Normal range of motion and normal capillary refill. No swelling, deformity, tenderness or bony tenderness. Normal pulse. Left foot: Normal range of motion and normal capillary refill. No tenderness or bony tenderness. Normal pulse. Comments: Right shoulder with range of motion limited due to pain. Tenderness to palpation at the [facility] joint and at the proximal humerus.    Skin:     General: Skin is warm and dry.      Capillary Refill: Capillary refill takes less than 2 seconds. Neurological:      General: No focal deficit present. Mental Status: She is alert. Psychiatric:         Mood and Affect: Mood normal.       RESULTS     EKG: All EKG's are interpreted by the Emergency Department Physician who either signs or Co-signs this chart in the absence of a cardiologist.      RADIOLOGY:   Russell Leghorn such as CT, Ultrasound and MRI are read by the radiologist.  All images reviewed by the emergency department physician who either signs or cosigns this chart. Interpretation per the Radiologist below, if available at the time ofthis note:    XR HIP RIGHT (2-3 VIEWS)   Final Result   No acute osseous abnormality. XR SHOULDER RIGHT (MIN 2 VIEWS)   Final Result   Calcific tendinosis. ED BEDSIDE ULTRASOUND:   Performed by ED Physician - none    LABS:  Labs Reviewed - No data to display    All other labs were within normal range or not returned as of this dictation. EMERGENCY DEPARTMENT COURSE and DIFFERENTIAL DIAGNOSIS/MDM:   Vitals:    Vitals:    10/16/22 1205   BP: (!) 154/98   Pulse: 95   Resp: 18   Temp: 98 °F (36.7 °C)   TempSrc: Oral   SpO2: 100%   Weight: (!) 313 lb 0.9 oz (142 kg)   Height: 5' 2\" (1.575 m)       Patient was given thefollowing medications:  Medications   lidocaine 4 % external patch 1 patch (1 patch TransDERmal Patch Applied 10/16/22 1301)   predniSONE (DELTASONE) tablet 60 mg (60 mg Oral Given 10/16/22 1230)   naproxen (NAPROSYN) tablet 250 mg (250 mg Oral Given 10/16/22 1231)       ED COURSE & MEDICAL DECISION MAKING    Pertinent Labs & Imaging studies reviewed. (See chart for details)   -  Patient seen and evaluated in the emergency department. -  Triage and nursing notes reviewed and incorporated. -  Old chart records reviewed and incorporated.   -  Differential diagnosis includes: Differential diagnosis: includes but not limited to rotator cuff sprain/tear/rupture, clavicle fracture, humerus fracture, scapular fracture, posterior or anterior glenohumeral joint dislocation, glenohumeral subluxation, AC joint separation, brachioplexus injury or other peripheral nerve injury, cervical spine neurologic or mechanical bony injury, left apical pneumothorax, arterial injury/Ischemia, Infection, other      53-year-old female presents for acute on chronic right shoulder pain with history of previous rotator cuff tendinitis. She does have strength in her shoulder she is able to lift it but it is significantly tender. There are no signs of complete rotator cuff tear at this time I likely think that this is an exacerbation of her tendinitis. We will obtain repeat films of her shoulder to ensure no bony abnormalities. Otherwise patient to continue NSAIDs. We will give patient lidocaine patch, prednisone as she has been on steroids before which have helped, naproxen as well as encourage ice and follow-up with orthopedics. Will reevaluate after imaging. Hip itself does not appear deformed she is amatory without difficulty we will also obtain x-ray. 2+ DP and PT pulses bilaterally 2+ radial pulses. Neurovascularly intact. -  Work-up included:  See above  -  ED treatment included: See above  -  Results discussed with patient. The patient is agreeable with plan of care and disposition. Is this patient to be included in the SEP-1 Core Measure due to severe sepsis or septic shock? No   Exclusion criteria - the patient is NOT to be included for SEP-1 Core Measure due to: Infection is not suspected     REASSESSMENT     ED Course as of 10/16/22 1316   Sun Oct 16, 2022   1316 Imaging shows calcific tendinosis consistent with prior rotator cuff tendinitis. We will give patient prescription for steroids as well as naproxen. Patient encouraged to follow-up with orthopedics and primary care.  [SC]      ED Course User Index  [SC] Rick Kiser MD     I estimate there is LOW risk for COMPARTMENT SYNDROME, DEEP VENOUS THROMBOSIS, SEPTIC ARTHRITIS, TENDON OR NEUROVASCULAR INJURY, thus I consider the discharge disposition reasonable. The patient is at low risk for mortality based on demographic, history and clinical factors. Given the best available information and clinical assessment, I estimate the risk of hospitalization to be greater than risk of treatment at home. I have explained to the patient that the risk could rapidly change, given precautions for return and instructions. Explained to patient that the risk for mortality is low based on demographic, history and clinical factors. I discussed with patient the results of evaluation in the ED, diagnosis, care, and prognosis. The plan is to discharge to home. Patient is in agreement with plan and questions have been answered. I also discussed with patient the reasons which may require a return visit and the importance of follow-up care. The patient is well-appearing, nontoxic, and improved at the time of discharge. Patient agrees to call to arrange follow-up care as directed. Patient understands to return immediately for worsening/change in symptoms. CRITICAL CARE TIME   Total Critical Care time was 0 minutes, excluding separately reportable procedures. There was a high probability of clinically significant/life threatening deterioration in the patient's condition which required my urgent intervention. This includes multiple reevaluations, vital sign monitoring, pulse oximetry monitoring, telemetry monitoring, clinical response to the IV medications, reviewing the nursing notes, consultation time, dictation/documentation time, and interpretation of the labwork. (This time excludes time spent performing procedures). CONSULTS:  None    PROCEDURES:  Unless otherwise noted below, none     Procedures    FINAL IMPRESSION      1. Calcific tendinitis of right shoulder    2.  Chronic right shoulder pain          DISPOSITION/PLAN   DISPOSITION Decision To Discharge 10/16/2022 01:16:04 PM      PATIENT REFERREDTO:  Brad See MD  [address] [facility]  [address] [place] [address]  [phone]    Schedule an appointment as soon as possible for a visit       [facility]    Schedule an appointment as soon as possible for a visit       DISCHARGEMEDICATIONS:  New Prescriptions    NAPROXEN (NAPROSYN) 500 MG TABLET    Take 1 tablet by mouth 2 times daily (with meals)    PREDNISONE (DELTASONE) 20 MG TABLET    Take 1 tablet by mouth daily for 5 days          (Please note that portions of this note were completed with a voice recognition program.  Efforts were made to edit the dictations but occasionally words are mis-transcribed.)    Claribel Fang MD (electronically signed)  Attending Emergency Physician          Claribel Fang MD  10/16/22 8012

## 2022-10-16 NOTE — ED NOTES
Pt states was rearranging furniture about 2 weeks ago. After trying to move furniture, developed next day pain right shoulder and right hip. Now right shoulder pain at 4, up to 8 with movement. Right hip pain now at 2, up to 6 with weight bearing. Took ibuprofen at 0600 today.        Luis F Morton RN  10/16/22 1213

## 2022-10-18 NOTE — ED NOTES
Explained new orders. meds being given. Lidocaine patch will be applied after xray so it isn't seen on film.      Royer Rodriguez RN  10/17/22 1353

## 2022-10-18 NOTE — ED NOTES
Feels better after patch on. Right shoulder pain at 3 and right hip pain at 2. Discharge instructions with pt.  explained rx's. Encouraged to follow up with her PCP and with her orthopedic specialist as recommended.      Elyssa Grider RN  10/17/22 4533

## 2022-10-20 ENCOUNTER — OFFICE VISIT (OUTPATIENT)
Dept: ORTHOPEDIC SURGERY | Age: 42
End: 2022-10-20
Payer: COMMERCIAL

## 2022-10-20 VITALS — RESPIRATION RATE: 16 BRPM | BODY MASS INDEX: 53.92 KG/M2 | HEIGHT: 62 IN | WEIGHT: 293 LBS

## 2022-10-20 DIAGNOSIS — M19.011 ARTHRITIS OF RIGHT ACROMIOCLAVICULAR JOINT: ICD-10-CM

## 2022-10-20 DIAGNOSIS — M75.81 ROTATOR CUFF TENDONITIS, RIGHT: Primary | ICD-10-CM

## 2022-10-20 PROCEDURE — 99213 OFFICE O/P EST LOW 20 MIN: CPT | Performed by: ORTHOPAEDIC SURGERY

## 2022-10-20 NOTE — PROGRESS NOTES
Raymundo Pantoja  [id number]  October 20, 2022    Chief Complaint   Patient presents with    Follow-up     Right shoulder          History: The patient is a 70-year-old female who is here for evaluation of her right shoulder. The patient received a right shoulder injection back in December. The injection helped significantly. Her pain returned approximately 2 weeks ago. She has difficulty with overhead activities. She did present to the emergency room and was given oral steroids. She has also been taking Naprosyn. Her pain has improved rather significantly. She now rates her pain as 1/10. The patient's  past medical history, medications, allergies,  family history, social history, and have been reviewed, and dated and are recorded in the chart. Pertinent items are noted in HPI. Review of systems reviewed from Pertinent History Form dated on 12/2/2021 and available in the patient's chart under the Media tab. Vitals:  Resp 16   Ht 5' 2\" (1.575 m)   Wt (!) 313 lb (142 kg)   LMP 09/20/2022   BMI 57.25 kg/m²     Physical: Physical: The patient presents today in no acute distress. She is alert and oriented to person, place and time. She displays appropriate mood and affect. She is well dressed, nourished and  groomed. She has a normal affect. Examination of the neck reveals no evidence of tenderness. Spurling's sign is negative. Active range of motion of the right shoulder is: 170 degrees abduction, 170 degrees forward flexion, 45 degrees of external rotation and internal rotation to L4. Passive range of motion of the right shoulder is: 175 degrees abduction, 175 degrees forward flexion, 50 degrees of external rotation and internal rotation to L3. Active and passive range of motion of the opposite shoulder is full. Examination of the right shoulder reveals positive Neer and Werner' impingement signs. There is mild subacromial crepitus with range of motion.        Drop arm test is negative bilaterally. Speed's test is negative. There is no evidence of tenderness over the Bicipital groove. She  does have tenderness [facility] the [facility] joint. Cross body adduction test is negative. She has moderate tenderness over the subacromial space. There is no evidence of scapular winging. Lift off sign is negative. There is no evidence of atrophy. External rotation strength is full. There are no skin lesions, cellulitis, or extreme edema in the upper extremities. Sensation is intact to axillary, median, radial, and ulnar nerves bilaterally. The patient has warm and well-perfused bilateral upper extremities with brisk capillary refill. Radial and ulnar pulses are palpable and 2+ bilaterally. X-rays: 3 views of the right shoulder obtained in the emergency room on 10/16 were extensively reviewed. There is a subtle calcification within the sub acromial space. There is a type II acromion. There is mild AC joint arthritis. Impression: #1 right shoulder calcific rotator cuff tendinitis #2 right shoulder AC joint arthritis    Plan: At this time we will attempt to treat the right shoulder conservatively. The treatment plan was discussed in detail with the patient and includes activity modification and avoidance of repetitive overhead activities. Home exercises were explained to the patient. A formal therapy program was discussed with the patient and was not indicated  The patient will follow up with me in 6 weeks and we will reassess her then. If the patient continues to have severe pain and weakness, we will consider an injection. She will finish out the oral steroids. She may continue taking the Naprosyn.

## 2022-12-08 ENCOUNTER — APPOINTMENT (OUTPATIENT)
Dept: CT IMAGING | Age: 42
End: 2022-12-08
Payer: COMMERCIAL

## 2022-12-08 ENCOUNTER — HOSPITAL ENCOUNTER (EMERGENCY)
Age: 42
Discharge: HOME | End: 2022-12-08
Attending: EMERGENCY MEDICINE
Payer: COMMERCIAL

## 2022-12-08 ENCOUNTER — APPOINTMENT (OUTPATIENT)
Dept: GENERAL RADIOLOGY | Age: 42
End: 2022-12-08
Payer: COMMERCIAL

## 2022-12-08 VITALS
HEIGHT: 62 IN | BODY MASS INDEX: 53.92 KG/M2 | HEART RATE: 86 BPM | OXYGEN SATURATION: 100 % | DIASTOLIC BLOOD PRESSURE: 84 MMHG | SYSTOLIC BLOOD PRESSURE: 113 MMHG | WEIGHT: 293 LBS | RESPIRATION RATE: 16 BRPM | TEMPERATURE: 98.4 F

## 2022-12-08 DIAGNOSIS — R07.89 CHEST WALL PAIN: Primary | ICD-10-CM

## 2022-12-08 DIAGNOSIS — R22.2 PARAVERTEBRAL MASS: ICD-10-CM

## 2022-12-08 DIAGNOSIS — R05.2 SUBACUTE COUGH: ICD-10-CM

## 2022-12-08 LAB
ANION GAP SERPL CALCULATED.3IONS-SCNC: 12 MMOL/L (ref 3–16)
BASOPHILS ABSOLUTE: 0.1 K/UL (ref 0–0.2)
BASOPHILS RELATIVE PERCENT: 0.8 %
BILIRUBIN URINE: NEGATIVE
BLOOD, URINE: NEGATIVE
BUN BLDV-MCNC: 11 MG/DL (ref 7–20)
CALCIUM SERPL-MCNC: 9.4 MG/DL (ref 8.3–10.6)
CHLORIDE BLD-SCNC: 99 MMOL/L (ref 99–110)
CLARITY: CLEAR
CO2: 24 MMOL/L (ref 21–32)
COLOR: YELLOW
CREAT SERPL-MCNC: 0.7 MG/DL (ref 0.6–1.1)
D DIMER: 0.94 UG/ML FEU (ref 0–0.6)
EKG ATRIAL RATE: 75 BPM
EKG DIAGNOSIS: NORMAL
EKG P AXIS: 26 DEGREES
EKG P-R INTERVAL: 166 MS
EKG Q-T INTERVAL: 410 MS
EKG QRS DURATION: 68 MS
EKG QTC CALCULATION (BAZETT): 457 MS
EKG R AXIS: 45 DEGREES
EKG T AXIS: 40 DEGREES
EKG VENTRICULAR RATE: 75 BPM
EOSINOPHILS ABSOLUTE: 0.2 K/UL (ref 0–0.6)
EOSINOPHILS RELATIVE PERCENT: 1.5 %
EPITHELIAL CELLS, UA: NORMAL /HPF (ref 0–5)
GFR SERPL CREATININE-BSD FRML MDRD: >60 ML/MIN/{1.73_M2}
GLUCOSE BLD-MCNC: 130 MG/DL (ref 70–99)
GLUCOSE URINE: NEGATIVE MG/DL
HCG(URINE) PREGNANCY TEST: NEGATIVE
HCT VFR BLD CALC: 35.4 % (ref 36–48)
HEMOGLOBIN: 12 G/DL (ref 12–16)
KETONES, URINE: NEGATIVE MG/DL
LEUKOCYTE ESTERASE, URINE: ABNORMAL
LYMPHOCYTES ABSOLUTE: 2.1 K/UL (ref 1–5.1)
LYMPHOCYTES RELATIVE PERCENT: 20.4 %
MCH RBC QN AUTO: 27.1 PG (ref 26–34)
MCHC RBC AUTO-ENTMCNC: 33.8 G/DL (ref 31–36)
MCV RBC AUTO: 80.1 FL (ref 80–100)
MICROSCOPIC EXAMINATION: YES
MONOCYTES ABSOLUTE: 0.8 K/UL (ref 0–1.3)
MONOCYTES RELATIVE PERCENT: 7.4 %
NEUTROPHILS ABSOLUTE: 7.3 K/UL (ref 1.7–7.7)
NEUTROPHILS RELATIVE PERCENT: 69.9 %
NITRITE, URINE: NEGATIVE
PDW BLD-RTO: 14.8 % (ref 12.4–15.4)
PH UA: 6.5 (ref 5–8)
PLATELET # BLD: 314 K/UL (ref 135–450)
PMV BLD AUTO: 7.7 FL (ref 5–10.5)
POTASSIUM REFLEX MAGNESIUM: 4.1 MMOL/L (ref 3.5–5.1)
PROTEIN UA: NEGATIVE MG/DL
RBC # BLD: 4.42 M/UL (ref 4–5.2)
RBC UA: NORMAL /HPF (ref 0–4)
SODIUM BLD-SCNC: 135 MMOL/L (ref 136–145)
SPECIFIC GRAVITY UA: 1.02 (ref 1–1.03)
TROPONIN: <0.01 NG/ML
URINE REFLEX TO CULTURE: ABNORMAL
URINE TYPE: ABNORMAL
UROBILINOGEN, URINE: 0.2 E.U./DL
WBC # BLD: 10.4 K/UL (ref 4–11)
WBC UA: NORMAL /HPF (ref 0–5)

## 2022-12-08 PROCEDURE — 99285 EMERGENCY DEPT VISIT HI MDM: CPT

## 2022-12-08 PROCEDURE — 6370000000 HC RX 637 (ALT 250 FOR IP): Performed by: EMERGENCY MEDICINE

## 2022-12-08 PROCEDURE — 6360000004 HC RX CONTRAST MEDICATION: Performed by: EMERGENCY MEDICINE

## 2022-12-08 PROCEDURE — 93010 ELECTROCARDIOGRAM REPORT: CPT | Performed by: INTERNAL MEDICINE

## 2022-12-08 PROCEDURE — 93005 ELECTROCARDIOGRAM TRACING: CPT | Performed by: EMERGENCY MEDICINE

## 2022-12-08 PROCEDURE — 80048 BASIC METABOLIC PNL TOTAL CA: CPT

## 2022-12-08 PROCEDURE — 85025 COMPLETE CBC W/AUTO DIFF WBC: CPT

## 2022-12-08 PROCEDURE — 84484 ASSAY OF TROPONIN QUANT: CPT

## 2022-12-08 PROCEDURE — 71045 X-RAY EXAM CHEST 1 VIEW: CPT

## 2022-12-08 PROCEDURE — 81001 URINALYSIS AUTO W/SCOPE: CPT

## 2022-12-08 PROCEDURE — 84703 CHORIONIC GONADOTROPIN ASSAY: CPT

## 2022-12-08 PROCEDURE — 85379 FIBRIN DEGRADATION QUANT: CPT

## 2022-12-08 PROCEDURE — 71260 CT THORAX DX C+: CPT | Performed by: EMERGENCY MEDICINE

## 2022-12-08 PROCEDURE — 36415 COLL VENOUS BLD VENIPUNCTURE: CPT

## 2022-12-08 RX ORDER — NAPROXEN 500 MG/1
500 TABLET ORAL 2 TIMES DAILY WITH MEALS
Qty: 60 TABLET | Refills: 5 | Status: SHIPPED | OUTPATIENT
Start: 2022-12-08

## 2022-12-08 RX ORDER — TIZANIDINE 4 MG/1
4 TABLET ORAL EVERY 6 HOURS PRN
Qty: 20 TABLET | Refills: 0 | Status: SHIPPED | OUTPATIENT
Start: 2022-12-08

## 2022-12-08 RX ORDER — GUAIFENESIN/DEXTROMETHORPHAN 100-10MG/5
5 SYRUP ORAL 3 TIMES DAILY PRN
Qty: 120 ML | Refills: 0 | Status: SHIPPED | OUTPATIENT
Start: 2022-12-08 | End: 2022-12-18

## 2022-12-08 RX ORDER — METHYLPREDNISOLONE 4 MG/1
4 TABLET ORAL SEE ADMIN INSTRUCTIONS
Qty: 1 KIT | Refills: 0 | Status: SHIPPED | OUTPATIENT
Start: 2022-12-08 | End: 2022-12-14

## 2022-12-08 RX ORDER — GUAIFENESIN/DEXTROMETHORPHAN 100-10MG/5
10 SYRUP ORAL ONCE
Status: COMPLETED | OUTPATIENT
Start: 2022-12-08 | End: 2022-12-08

## 2022-12-08 RX ADMIN — GUAIFENESIN SYRUP AND DEXTROMETHORPHAN 10 ML: 100; 10 SYRUP ORAL at 04:36

## 2022-12-08 RX ADMIN — IOPAMIDOL 150 ML: 755 INJECTION, SOLUTION INTRAVENOUS at 06:02

## 2022-12-08 ASSESSMENT — PAIN DESCRIPTION - ORIENTATION: ORIENTATION: MID

## 2022-12-08 ASSESSMENT — PAIN DESCRIPTION - DESCRIPTORS: DESCRIPTORS: DISCOMFORT

## 2022-12-08 ASSESSMENT — PAIN SCALES - GENERAL
PAINLEVEL_OUTOF10: 2
PAINLEVEL_OUTOF10: 4

## 2022-12-08 ASSESSMENT — PAIN DESCRIPTION - LOCATION: LOCATION: CHEST

## 2022-12-08 ASSESSMENT — PAIN DESCRIPTION - ONSET: ONSET: SUDDEN

## 2022-12-08 ASSESSMENT — PAIN DESCRIPTION - FREQUENCY: FREQUENCY: CONTINUOUS

## 2022-12-08 ASSESSMENT — PAIN DESCRIPTION - PAIN TYPE: TYPE: ACUTE PAIN

## 2022-12-08 ASSESSMENT — PAIN - FUNCTIONAL ASSESSMENT
PAIN_FUNCTIONAL_ASSESSMENT: ACTIVITIES ARE NOT PREVENTED
PAIN_FUNCTIONAL_ASSESSMENT: 0-10

## 2022-12-08 NOTE — ED TRIAGE NOTES
Patient to the ER with complaints of chest pain and congestion with blood tinged sputum. Patient had a positive at home Covid test with symptoms that had started on Thanksgiving. She was seen at an urgent care on 12/5 because she wasn't getting any better. They gave her Mucinex and amoxicillin. This morning she woke up and had blood tinged sputum and worsening chest pain so she decided to come in. She reports that the pain is worse when deep breathing or coughing. Alert and oriented x4 and ambulatory at time of triage.

## 2022-12-08 NOTE — ED NOTES
D/C: Order noted for d/c. Pt confirmed d/c paperwork and three prescriptions have correct name. Discharge and education instructions reviewed with patient. Teach-back successful. Pt verbalized understanding and signed d/c papers. Pt denied questions at this time. No acute distress noted. Patient instructed to follow-up as noted - return to emergency department if symptoms worsen. Patient verbalized understanding. Discharged per EDMD with discharge instructions. Pt discharged to private vehicle. Patient stable upon departure. Thanked patient for choosing [facility] for care. Provider aware of patient pain at time of discharge.        Cameron Stanton RN  12/08/22 5864

## 2022-12-08 NOTE — ED PROVIDER NOTES
[address] [place] [address] ENCOUNTER      Pt Name: Cynthia Iqbal  MRN: [id number]  Hailytrongfchito 1980  Date of evaluation: 12/8/2022  Provider: Karime Azul[address] [place] [address]  Chief Complaint   Patient presents with    Chest Congestion    Chest Pain         This patient was turned over to me at 0700 By Dr. Coco Varela. They were examined by me and I agree with the history and physical examination of  . After my evaluation of this patient, and review of the labs and imaging studies, they will be discharged. Past Medical History:   Diagnosis Date    Alopecia     hair loss    Anesthesia     pt states she wakes up during surgery    Back pain     injury 15 years ago--pt states she is not medically treated    Cold intolerance     Esophageal reflux 9/29/2009    GERD (gastroesophageal reflux disease)     Gestational diabetes     Hiatal hernia     Seasonal allergies     spring and summer    Weight gain     OB thought due hormonal imbalance       Vitals:    12/08/22 0421   BP: (!) 138/92   Pulse: 82   Resp: 18   Temp: 98.4 °F (36.9 °C)   SpO2: 98%       MDM:  Cynthia Iqbal is a 38 y.o. female who presents with chest pain shortness of breath that has been present for few days. She was diagnosed with COVID but is gotten worse. She denies any fevers. She denies any nausea vomiting. She has been short of breath and coughing. She has had some chest pain. Physical exam is unremarkable. Heart is regular rate and rhythm without murmurs clicks or rubs. Lungs are clear auscultation equal bilaterally abdomen soft and nontender. Laboratory work revealed an elevated D-dimer. Therefore, CT chest PE protocol was ordered. There is no evidence of pulmonary embolus. However, there was a paravertebral mass on the right at T8 that needs further evaluation as an outpatient. Patient was informed of this.   It was also placed in her discharge instructions and she was instructed to follow with her doctor in 1 week for further evaluation and treatment. Vitals:    12/08/22 0421   BP: (!) 138/92   Pulse: 82   Resp: 18   Temp: 98.4 °F (36.9 °C)   SpO2: 98%       Labs Reviewed   CBC WITH AUTO DIFFERENTIAL - Abnormal; Notable for the following components:       Result Value    Hematocrit 35.4 (*)     All other components within normal limits   BASIC METABOLIC PANEL W/ REFLEX TO MG FOR LOW K - Abnormal; Notable for the following components:    Sodium 135 (*)     Glucose 130 (*)     All other components within normal limits   D-DIMER, QUANTITATIVE - Abnormal; Notable for the following components:    D-Dimer, Quant 0.94 (*)     All other components within normal limits   URINALYSIS WITH REFLEX TO CULTURE - Abnormal; Notable for the following components:    Leukocyte Esterase, Urine SMALL (*)     All other components within normal limits   TROPONIN   PREGNANCY, URINE   MICROSCOPIC URINALYSIS       CT CHEST PULMONARY EMBOLISM W CONTRAST   Final Result   1. Indeterminate 2.5 x 1 cm right paravertebral mass abutting the T8   vertebral body without bony destruction. No prior comparisons available for   this region. This may represent a neurogenic neoplasm such as a schwannoma. Recommend obtaining thoracic spine MRI for a more sensitive evaluation   2. The central pulmonary arteries are adequately opacified, no large or   central pulmonary embolism noted. Segmental and distal pulmonary arteries are poorly opacified with limited   resolution. Therefore, segmental or subsegmental pulmonary embolism difficult   to exclude on this examination due to limited resolution. 3. Mild diffuse ground-glass infiltrates throughout right lung. XR CHEST PORTABLE   Final Result   Clear chest without acute cardiopulmonary process. Pertinent Labs & Imaging studies reviewed. (See chart for details)    Patient remained stable in the ED.      The patient's blood pressure was found to be elevated according to CMS/Medicare and the Affordable Care Act/[facility] criteria. Elevated blood pressure could occur because of pain or anxiety or other reasons and does not mean that they need to have their blood pressure treated or medications otherwise adjusted. However, this could also be a sign that they will need to have their blood pressure treated or medications changed. The patient was instructed to follow up closely with their personal physician to have their blood pressure rechecked. The patient was instructed to take a list of recent blood pressure readings to their next visit with their personal physician. See discharge instructions for specific medications, discharge information, and treatments. They were verbally instructed to return to emergency if any problems. Medications   guaiFENesin-dextromethorphan (ROBITUSSIN DM) 100-10 MG/5ML syrup 10 mL (10 mLs Oral Given 12/8/22 0436)   iopamidol (ISOVUE-370) 76 % injection 75 mL (150 mLs IntraVENous Given 12/8/22 0602)       New Prescriptions    GUAIFENESIN-DEXTROMETHORPHAN (ROBITUSSIN DM) 100-10 MG/5ML SYRUP    Take 5 mLs by mouth 3 times daily as needed for Cough    NAPROXEN (NAPROSYN) 500 MG TABLET    Take 1 tablet by mouth 2 times daily (with meals)    TIZANIDINE (ZANAFLEX) 4 MG TABLET    Take 1 tablet by mouth every 6 hours as needed (muscle aches)       I reviewed old records     (This chart has been completed using [address]. Although attempts have been made to ensure accuracy, words and/or phrases may not be transcribed as intended.)    Patient refused pain medicines at the time of their exam.    IMPRESSION(S):  1. Chest wall pain    2. Subacute cough    3. Paravertebral mass      ?   Recheck Times: [address], DO  12/08/22 0800

## 2022-12-08 NOTE — Clinical Note
Michel Saavedra was seen and treated in our emergency department on 12/8/2022. She may return to work on 12/15/2022. No restrictions     If you have any questions or concerns, please don't hesitate to call.       Hannah Renner, DO

## 2022-12-08 NOTE — DISCHARGE INSTRUCTIONS
You need to follow-up with your family doctor this week or early next week for further evaluation of the paravertebral mass.

## 2023-01-18 ENCOUNTER — HOSPITAL ENCOUNTER (OUTPATIENT)
Dept: MRI IMAGING | Age: 43
Discharge: HOME | End: 2023-01-18
Payer: COMMERCIAL

## 2023-01-18 DIAGNOSIS — R22.2 PARASPINAL MASS: ICD-10-CM

## 2023-01-18 PROCEDURE — A9577 INJ MULTIHANCE: HCPCS | Performed by: INTERNAL MEDICINE

## 2023-01-18 PROCEDURE — 72157 MRI CHEST SPINE W/O & W/DYE: CPT

## 2023-01-18 PROCEDURE — 6360000004 HC RX CONTRAST MEDICATION: Performed by: INTERNAL MEDICINE

## 2023-01-18 RX ADMIN — GADOBENATE DIMEGLUMINE 20 ML: 529 INJECTION, SOLUTION INTRAVENOUS at 18:01

## 2023-08-04 ENCOUNTER — OFFICE VISIT (OUTPATIENT)
Dept: ORTHOPEDIC SURGERY | Age: 43
End: 2023-08-04

## 2023-08-04 ENCOUNTER — TELEPHONE (OUTPATIENT)
Dept: ORTHOPEDIC SURGERY | Age: 43
End: 2023-08-04

## 2023-08-04 VITALS — HEIGHT: 62 IN | WEIGHT: 293 LBS | BODY MASS INDEX: 53.92 KG/M2

## 2023-08-04 DIAGNOSIS — M25.562 CHRONIC PAIN OF LEFT KNEE: Primary | ICD-10-CM

## 2023-08-04 DIAGNOSIS — G89.29 CHRONIC PAIN OF LEFT KNEE: Primary | ICD-10-CM

## 2023-08-04 NOTE — TELEPHONE ENCOUNTER
Received insurance approval for MRI to be scheduled at [facility]. Patient may call [phone] to schedule. Once scheduled, patient should call our office back to make a follow-up appointment to go over the results. I left a detailed VM for pt letting her know.

## 2023-08-04 NOTE — TELEPHONE ENCOUNTER
Alejandra Motta MA  P [facility] Clinical Support    MRI LEFT KNEE approved Auth# [id number]

## 2023-08-25 ENCOUNTER — OFFICE VISIT (OUTPATIENT)
Dept: ORTHOPEDIC SURGERY | Age: 43
End: 2023-08-25
Payer: COMMERCIAL

## 2023-08-25 VITALS — HEIGHT: 62 IN | BODY MASS INDEX: 53.92 KG/M2 | WEIGHT: 293 LBS

## 2023-08-25 DIAGNOSIS — G89.29 CHRONIC PAIN OF LEFT KNEE: Primary | ICD-10-CM

## 2023-08-25 DIAGNOSIS — M25.562 CHRONIC PAIN OF LEFT KNEE: Primary | ICD-10-CM

## 2023-08-25 PROCEDURE — G8417 CALC BMI ABV UP PARAM F/U: HCPCS | Performed by: ORTHOPAEDIC SURGERY

## 2023-08-25 PROCEDURE — G8427 DOCREV CUR MEDS BY ELIG CLIN: HCPCS | Performed by: ORTHOPAEDIC SURGERY

## 2023-08-25 PROCEDURE — 1036F TOBACCO NON-USER: CPT | Performed by: ORTHOPAEDIC SURGERY

## 2023-08-25 PROCEDURE — 99214 OFFICE O/P EST MOD 30 MIN: CPT | Performed by: ORTHOPAEDIC SURGERY

## 2023-08-25 RX ORDER — METHYLPREDNISOLONE 4 MG/1
TABLET ORAL
Qty: 1 KIT | Refills: 0 | Status: SHIPPED | OUTPATIENT
Start: 2023-08-25 | End: 2023-08-31

## 2025-06-18 ENCOUNTER — HOSPITAL ENCOUNTER (EMERGENCY)
Age: 45
Discharge: HOME | End: 2025-06-18
Attending: STUDENT IN AN ORGANIZED HEALTH CARE EDUCATION/TRAINING PROGRAM
Payer: COMMERCIAL

## 2025-06-18 ENCOUNTER — APPOINTMENT (OUTPATIENT)
Dept: CT IMAGING | Age: 45
End: 2025-06-18
Payer: COMMERCIAL

## 2025-06-18 VITALS
HEIGHT: 62 IN | SYSTOLIC BLOOD PRESSURE: 142 MMHG | OXYGEN SATURATION: 100 % | BODY MASS INDEX: 53.62 KG/M2 | DIASTOLIC BLOOD PRESSURE: 90 MMHG | RESPIRATION RATE: 16 BRPM | WEIGHT: 291.4 LBS | HEART RATE: 93 BPM | TEMPERATURE: 98.1 F

## 2025-06-18 DIAGNOSIS — K57.90 DIVERTICULOSIS: ICD-10-CM

## 2025-06-18 DIAGNOSIS — R11.2 NAUSEA AND VOMITING, UNSPECIFIED VOMITING TYPE: Primary | ICD-10-CM

## 2025-06-18 DIAGNOSIS — R10.13 ABDOMINAL PAIN, EPIGASTRIC: ICD-10-CM

## 2025-06-18 DIAGNOSIS — R22.2 PARAVERTEBRAL MASS: ICD-10-CM

## 2025-06-18 DIAGNOSIS — K44.9 HIATAL HERNIA: ICD-10-CM

## 2025-06-18 LAB
ALBUMIN SERPL-MCNC: 4.7 G/DL (ref 3.4–5)
ALBUMIN/GLOB SERPL: 1.5 {RATIO} (ref 1.1–2.2)
ALP SERPL-CCNC: 100 U/L (ref 40–129)
ALT SERPL-CCNC: 11 U/L (ref 10–40)
ANION GAP SERPL CALCULATED.3IONS-SCNC: 15 MMOL/L (ref 3–16)
AST SERPL-CCNC: 20 U/L (ref 15–37)
BACTERIA URNS QL MICRO: NORMAL /HPF
BASE EXCESS BLDV CALC-SCNC: -1.8 MMOL/L (ref -3–3)
BASOPHILS # BLD: 0.1 K/UL (ref 0–0.2)
BASOPHILS NFR BLD: 0.4 %
BILIRUB SERPL-MCNC: 0.4 MG/DL (ref 0–1)
BILIRUB UR QL STRIP.AUTO: NEGATIVE
BUN SERPL-MCNC: 8 MG/DL (ref 7–20)
CALCIUM SERPL-MCNC: 9.5 MG/DL (ref 8.3–10.6)
CHLORIDE SERPL-SCNC: 99 MMOL/L (ref 99–110)
CLARITY UR: CLEAR
CO2 BLDV-SCNC: 59 MMOL/L
CO2 SERPL-SCNC: 23 MMOL/L (ref 21–32)
COHGB MFR BLDV: 2.5 % (ref 0–1.5)
COLOR UR: YELLOW
CREAT SERPL-MCNC: 0.7 MG/DL (ref 0.6–1.1)
DEPRECATED RDW RBC AUTO: 15.2 % (ref 12.4–15.4)
DO-HGB MFR BLDV: 5 %
EOSINOPHIL # BLD: 0 K/UL (ref 0–0.6)
EOSINOPHIL NFR BLD: 0 %
EPI CELLS #/AREA URNS AUTO: 0 /HPF (ref 0–5)
FLUAV RNA RESP QL NAA+PROBE: NOT DETECTED
FLUBV RNA RESP QL NAA+PROBE: NOT DETECTED
GFR SERPLBLD CREATININE-BSD FMLA CKD-EPI: >90 ML/MIN/{1.73_M2}
GLUCOSE SERPL-MCNC: 167 MG/DL (ref 70–99)
GLUCOSE UR STRIP.AUTO-MCNC: NEGATIVE MG/DL
HCG SERPL QL: NEGATIVE
HCO3 BLDV-SCNC: 24.9 MMOL/L (ref 23–29)
HCT VFR BLD AUTO: 39.8 % (ref 36–48)
HGB BLD-MCNC: 13.3 G/DL (ref 12–16)
HGB UR QL STRIP.AUTO: NEGATIVE
HYALINE CASTS #/AREA URNS AUTO: 0 /LPF (ref 0–8)
KETONES UR STRIP.AUTO-MCNC: ABNORMAL MG/DL
LEUKOCYTE ESTERASE UR QL STRIP.AUTO: NEGATIVE
LIPASE SERPL-CCNC: 18 U/L (ref 13–60)
LYMPHOCYTES # BLD: 0.5 K/UL (ref 1–5.1)
LYMPHOCYTES NFR BLD: 4.3 %
MCH RBC QN AUTO: 26.9 PG (ref 26–34)
MCHC RBC AUTO-ENTMCNC: 33.3 G/DL (ref 31–36)
MCV RBC AUTO: 80.9 FL (ref 80–100)
METHGB MFR BLDV: 0.7 %
MONOCYTES # BLD: 0.1 K/UL (ref 0–1.3)
MONOCYTES NFR BLD: 1.2 %
NEUTROPHILS # BLD: 11.3 K/UL (ref 1.7–7.7)
NEUTROPHILS NFR BLD: 94.1 %
NITRITE UR QL STRIP.AUTO: NEGATIVE
O2 CT VFR BLDV CALC: 19 VOL %
O2 THERAPY: ABNORMAL
PCO2 BLDV: 48.6 MMHG (ref 40–50)
PH BLDV: 7.32 [PH] (ref 7.35–7.45)
PH UR STRIP.AUTO: 7.5 [PH] (ref 5–8)
PLATELET # BLD AUTO: 311 K/UL (ref 135–450)
PMV BLD AUTO: 7.8 FL (ref 5–10.5)
PO2 BLDV: 82.1 MMHG (ref 25–40)
POTASSIUM SERPL-SCNC: 3.7 MMOL/L (ref 3.5–5.1)
PROT SERPL-MCNC: 7.9 G/DL (ref 6.4–8.2)
PROT UR STRIP.AUTO-MCNC: ABNORMAL MG/DL
RBC # BLD AUTO: 4.92 M/UL (ref 4–5.2)
RBC CLUMPS #/AREA URNS AUTO: 1 /HPF (ref 0–4)
SAO2 % BLDV: 95 %
SARS-COV-2 RNA RESP QL NAA+PROBE: NOT DETECTED
SODIUM SERPL-SCNC: 137 MMOL/L (ref 136–145)
SP GR UR STRIP.AUTO: >=1.03 (ref 1–1.03)
UA COMPLETE W REFLEX CULTURE PNL UR: ABNORMAL
UA DIPSTICK W REFLEX MICRO PNL UR: YES
URN SPEC COLLECT METH UR: ABNORMAL
UROBILINOGEN UR STRIP-ACNC: 0.2 E.U./DL
WBC # BLD AUTO: 12 K/UL (ref 4–11)
WBC #/AREA URNS AUTO: 0 /HPF (ref 0–5)

## 2025-06-18 PROCEDURE — 96361 HYDRATE IV INFUSION ADD-ON: CPT

## 2025-06-18 PROCEDURE — 6360000004 HC RX CONTRAST MEDICATION

## 2025-06-18 PROCEDURE — 96375 TX/PRO/DX INJ NEW DRUG ADDON: CPT

## 2025-06-18 PROCEDURE — 6370000000 HC RX 637 (ALT 250 FOR IP): Performed by: STUDENT IN AN ORGANIZED HEALTH CARE EDUCATION/TRAINING PROGRAM

## 2025-06-18 PROCEDURE — 2580000003 HC RX 258

## 2025-06-18 PROCEDURE — 96376 TX/PRO/DX INJ SAME DRUG ADON: CPT

## 2025-06-18 PROCEDURE — 74177 CT ABD & PELVIS W/CONTRAST: CPT

## 2025-06-18 PROCEDURE — 81001 URINALYSIS AUTO W/SCOPE: CPT

## 2025-06-18 PROCEDURE — 80053 COMPREHEN METABOLIC PANEL: CPT

## 2025-06-18 PROCEDURE — 84703 CHORIONIC GONADOTROPIN ASSAY: CPT

## 2025-06-18 PROCEDURE — 96374 THER/PROPH/DIAG INJ IV PUSH: CPT

## 2025-06-18 PROCEDURE — 83690 ASSAY OF LIPASE: CPT

## 2025-06-18 PROCEDURE — 87636 SARSCOV2 & INF A&B AMP PRB: CPT

## 2025-06-18 PROCEDURE — 85025 COMPLETE CBC W/AUTO DIFF WBC: CPT

## 2025-06-18 PROCEDURE — 99285 EMERGENCY DEPT VISIT HI MDM: CPT

## 2025-06-18 PROCEDURE — 82803 BLOOD GASES ANY COMBINATION: CPT

## 2025-06-18 PROCEDURE — 6360000002 HC RX W HCPCS

## 2025-06-18 RX ORDER — IOPAMIDOL 755 MG/ML
75 INJECTION, SOLUTION INTRAVASCULAR
Status: COMPLETED | OUTPATIENT
Start: 2025-06-18 | End: 2025-06-18

## 2025-06-18 RX ORDER — PANTOPRAZOLE SODIUM 40 MG/10ML
40 INJECTION, POWDER, LYOPHILIZED, FOR SOLUTION INTRAVENOUS ONCE
Status: COMPLETED | OUTPATIENT
Start: 2025-06-18 | End: 2025-06-18

## 2025-06-18 RX ORDER — ONDANSETRON 4 MG/1
4 TABLET, FILM COATED ORAL EVERY 8 HOURS PRN
Qty: 20 TABLET | Refills: 0 | Status: SHIPPED | OUTPATIENT
Start: 2025-06-18

## 2025-06-18 RX ORDER — 0.9 % SODIUM CHLORIDE 0.9 %
1000 INTRAVENOUS SOLUTION INTRAVENOUS ONCE
Status: COMPLETED | OUTPATIENT
Start: 2025-06-18 | End: 2025-06-18

## 2025-06-18 RX ORDER — FAMOTIDINE 20 MG/1
20 TABLET, FILM COATED ORAL 2 TIMES DAILY
Qty: 60 TABLET | Refills: 0 | Status: SHIPPED | OUTPATIENT
Start: 2025-06-18

## 2025-06-18 RX ORDER — PROCHLORPERAZINE EDISYLATE 5 MG/ML
10 INJECTION INTRAMUSCULAR; INTRAVENOUS ONCE
Status: COMPLETED | OUTPATIENT
Start: 2025-06-18 | End: 2025-06-18

## 2025-06-18 RX ORDER — ACETAMINOPHEN 325 MG/1
650 TABLET ORAL EVERY 6 HOURS PRN
Qty: 120 TABLET | Refills: 3 | Status: SHIPPED | OUTPATIENT
Start: 2025-06-18

## 2025-06-18 RX ORDER — OMEPRAZOLE 20 MG/1
20 CAPSULE, DELAYED RELEASE ORAL
Qty: 30 CAPSULE | Refills: 0 | Status: SHIPPED | OUTPATIENT
Start: 2025-06-18

## 2025-06-18 RX ORDER — ONDANSETRON 2 MG/ML
4 INJECTION INTRAMUSCULAR; INTRAVENOUS
Status: COMPLETED | OUTPATIENT
Start: 2025-06-18 | End: 2025-06-18

## 2025-06-18 RX ADMIN — ONDANSETRON 4 MG: 2 INJECTION, SOLUTION INTRAMUSCULAR; INTRAVENOUS at 10:58

## 2025-06-18 RX ADMIN — PANTOPRAZOLE SODIUM 40 MG: 40 INJECTION, POWDER, FOR SOLUTION INTRAVENOUS at 10:06

## 2025-06-18 RX ADMIN — SODIUM CHLORIDE 1000 ML: 0.9 INJECTION, SOLUTION INTRAVENOUS at 10:06

## 2025-06-18 RX ADMIN — PROCHLORPERAZINE EDISYLATE 10 MG: 5 INJECTION INTRAMUSCULAR; INTRAVENOUS at 12:44

## 2025-06-18 RX ADMIN — IOPAMIDOL 75 ML: 755 INJECTION, SOLUTION INTRAVENOUS at 10:19

## 2025-06-18 RX ADMIN — LIDOCAINE HYDROCHLORIDE: 20 SOLUTION ORAL at 12:44

## 2025-06-18 RX ADMIN — ONDANSETRON 4 MG: 2 INJECTION, SOLUTION INTRAMUSCULAR; INTRAVENOUS at 08:48

## 2025-06-18 ASSESSMENT — PAIN DESCRIPTION - LOCATION: LOCATION: ABDOMEN

## 2025-06-18 ASSESSMENT — PAIN SCALES - GENERAL
PAINLEVEL_OUTOF10: 5
PAINLEVEL_OUTOF10: 7

## 2025-06-18 ASSESSMENT — LIFESTYLE VARIABLES
HOW OFTEN DO YOU HAVE A DRINK CONTAINING ALCOHOL: NEVER
HOW MANY STANDARD DRINKS CONTAINING ALCOHOL DO YOU HAVE ON A TYPICAL DAY: PATIENT DOES NOT DRINK

## 2025-06-18 ASSESSMENT — PAIN DESCRIPTION - DESCRIPTORS: DESCRIPTORS: NAGGING

## 2025-06-18 ASSESSMENT — PAIN DESCRIPTION - ORIENTATION: ORIENTATION: UPPER

## 2025-06-18 ASSESSMENT — PAIN - FUNCTIONAL ASSESSMENT: PAIN_FUNCTIONAL_ASSESSMENT: 0-10

## 2025-06-18 NOTE — ED PROVIDER NOTES
I have personally performed a face to face diagnostic evaluation on this patient. I have fully participated in the care of this patient I personally saw the patient and performed a substantive portion of the visit including all aspects of the medical decision making.  I have reviewed and agree with all pertinent clinical information including history, physical exam, diagnostic tests, and the plan.    Medical Decision Making  Pt with  epigastric pain ongoing for several days.  Vomiting started since yesterday.  She tried Mylanta which did not help.  Small amount of diarrhea as well.  No fevers at home.  Only social alcohol use.  History of lipoma resection to the sigmoid colon  Follows with neurosurgery through  regarding T8 vertebral body mass found on CT chest in 2023  Is not on any antacid medication routinely.  Has never had endoscopy.  History of hiatal hernia as well.    On exam pt is resting comfortably  RRR, no increased work of breathing  Abdomen soft, nondistended, obese, tolerates deep palpation to epigastrium    Pain appears very consistent with gastritis versus ulcer versus dyspepsia.  Patient improved here with an antacid medication, Zofran, antiemetic therapy.  Tolerated p.o.  With benign abdominal exam, low suspicion for mesenteric ischemia.  CT abdomen pelvis is negative for bowel obstruction perforation, intra-abdominal abscess, appendicitis or diverticulitis.  Hiatal hernia noted likely causing some of her symptoms.  T8 mass is stable and she is aware of this mass.    CBC: no clinically significant anemia, leukocytosis, thrombocytopenia.  Minimal leukocytosis likely secondary to emesis less likely bacterial infection.  BMP: no clinically significant electrolyte derangement or LADAN  LFTs showing no evidence of acute hepatic injury, transaminitis or hyperbilirubinemia.  Urine without convincing infection.  No acidosis on VBG.  COVID flu negative.  Pregnancy negative.  Lipase not suggestive of acute 
patient to be included in the SEP-1 Core Measure due to severe sepsis or septic shock?   No   Exclusion criteria - the patient is NOT to be included for SEP-1 Core Measure due to:  Infection is not suspected    Patient was given the following medications:  Medications   prochlorperazine (COMPAZINE) injection 10 mg (has no administration in time range)   aluminum & magnesium hydroxide-simethicone (MAALOX PLUS) 30 mL, lidocaine viscous hcl (XYLOCAINE) 5 mL (GI COCKTAIL) (has no administration in time range)   ondansetron (ZOFRAN) injection 4 mg (4 mg IntraVENous Given 6/18/25 1058)   pantoprazole (PROTONIX) injection 40 mg (40 mg IntraVENous Given 6/18/25 1006)   0.9 % sodium chloride IV bolus 1,000 mL (0 mLs IntraVENous Stopped 6/18/25 1104)   iopamidol (ISOVUE-370) 76 % injection 75 mL (75 mLs IntraVENous Given 6/18/25 1019)             Chronic Conditions affecting care: GERD & hiatal hernia, possible etiologies of vomiting today    has a past medical history of Alopecia, Anesthesia, Back pain, Cold intolerance, Esophageal reflux (9/29/2009), GERD (gastroesophageal reflux disease), Gestational diabetes, Hiatal hernia, Seasonal allergies, and Weight gain.    CONSULTS: (Who and What was discussed)  None    Records Reviewed (External, Source and Summary) office visit for/11/2023, patient has GERD and history of esophagitis    CC/HPI Summary, DDx, ED Course, and Reassessment: The patient's abdominal pain was located in the epigastric region and she had reported profuse vomiting.   Abdomen was tender upon palpation to the epigastric region, no tenderness elsewhere. Negative Phipps's sign, McBurney's sign, obturator sign, no rebound tenderness, no guarding/rigidity, bowel sounds are normal. No peritoneal signs. CT abdomen pelvis with contrast was performed given patients history, physical exam, and my clinical judgement.     CBC revealed leukocytosis.  No evidence of anemia.  CMP normal.  Lipase normal.    Urinalysis with

## 2025-06-18 NOTE — DISCHARGE INSTRUCTIONS
Your paravertebral mass at the T8 level appears stable compared to prior imaging. Incidental findings on CT today including diverticulosis and hiatal hernia.